# Patient Record
Sex: MALE | Race: WHITE | NOT HISPANIC OR LATINO
[De-identification: names, ages, dates, MRNs, and addresses within clinical notes are randomized per-mention and may not be internally consistent; named-entity substitution may affect disease eponyms.]

---

## 2017-03-01 ENCOUNTER — APPOINTMENT (OUTPATIENT)
Dept: ORTHOPEDIC SURGERY | Facility: CLINIC | Age: 67
End: 2017-03-01

## 2017-03-01 VITALS — WEIGHT: 210 LBS | BODY MASS INDEX: 32.96 KG/M2 | HEIGHT: 67 IN

## 2017-10-19 ENCOUNTER — EMERGENCY (EMERGENCY)
Facility: HOSPITAL | Age: 67
LOS: 1 days | Discharge: HOME | End: 2017-10-19
Attending: EMERGENCY MEDICINE | Admitting: EMERGENCY MEDICINE
Payer: MEDICARE

## 2017-10-19 VITALS
RESPIRATION RATE: 18 BRPM | OXYGEN SATURATION: 98 % | DIASTOLIC BLOOD PRESSURE: 106 MMHG | TEMPERATURE: 98 F | HEART RATE: 84 BPM | SYSTOLIC BLOOD PRESSURE: 190 MMHG

## 2017-10-19 VITALS
OXYGEN SATURATION: 100 % | HEART RATE: 86 BPM | SYSTOLIC BLOOD PRESSURE: 167 MMHG | DIASTOLIC BLOOD PRESSURE: 89 MMHG | RESPIRATION RATE: 18 BRPM

## 2017-10-19 LAB
ALBUMIN SERPL ELPH-MCNC: 4 G/DL — SIGNIFICANT CHANGE UP (ref 3.3–5)
ALP SERPL-CCNC: 50 U/L — SIGNIFICANT CHANGE UP (ref 40–120)
ALT FLD-CCNC: 15 U/L — SIGNIFICANT CHANGE UP (ref 4–41)
APTT BLD: 33 SEC — SIGNIFICANT CHANGE UP (ref 27.5–37.4)
AST SERPL-CCNC: 20 U/L — SIGNIFICANT CHANGE UP (ref 4–40)
BILIRUB SERPL-MCNC: 0.4 MG/DL — SIGNIFICANT CHANGE UP (ref 0.2–1.2)
BUN SERPL-MCNC: 16 MG/DL — SIGNIFICANT CHANGE UP (ref 7–23)
CALCIUM SERPL-MCNC: 8.9 MG/DL — SIGNIFICANT CHANGE UP (ref 8.4–10.5)
CHLORIDE SERPL-SCNC: 100 MMOL/L — SIGNIFICANT CHANGE UP (ref 98–107)
CO2 SERPL-SCNC: 29 MMOL/L — SIGNIFICANT CHANGE UP (ref 22–31)
CREAT SERPL-MCNC: 0.77 MG/DL — SIGNIFICANT CHANGE UP (ref 0.5–1.3)
GLUCOSE SERPL-MCNC: 69 MG/DL — LOW (ref 70–99)
HCT VFR BLD CALC: 35.5 % — LOW (ref 39–50)
HGB BLD-MCNC: 10.5 G/DL — LOW (ref 13–17)
INR BLD: 1.16 — SIGNIFICANT CHANGE UP (ref 0.88–1.17)
MCHC RBC-ENTMCNC: 18.2 PG — LOW (ref 27–34)
MCHC RBC-ENTMCNC: 29.6 % — LOW (ref 32–36)
MCV RBC AUTO: 61.5 FL — LOW (ref 80–100)
NRBC # FLD: 0 — SIGNIFICANT CHANGE UP
PLATELET # BLD AUTO: 265 K/UL — SIGNIFICANT CHANGE UP (ref 150–400)
PMV BLD: SIGNIFICANT CHANGE UP FL (ref 7–13)
POTASSIUM SERPL-MCNC: 3.9 MMOL/L — SIGNIFICANT CHANGE UP (ref 3.5–5.3)
POTASSIUM SERPL-SCNC: 3.9 MMOL/L — SIGNIFICANT CHANGE UP (ref 3.5–5.3)
PROT SERPL-MCNC: 7 G/DL — SIGNIFICANT CHANGE UP (ref 6–8.3)
PROTHROM AB SERPL-ACNC: 13 SEC — SIGNIFICANT CHANGE UP (ref 9.8–13.1)
RBC # BLD: 5.77 M/UL — SIGNIFICANT CHANGE UP (ref 4.2–5.8)
RBC # FLD: 16.5 % — HIGH (ref 10.3–14.5)
SODIUM SERPL-SCNC: 142 MMOL/L — SIGNIFICANT CHANGE UP (ref 135–145)
WBC # BLD: 8.85 K/UL — SIGNIFICANT CHANGE UP (ref 3.8–10.5)
WBC # FLD AUTO: 8.85 K/UL — SIGNIFICANT CHANGE UP (ref 3.8–10.5)

## 2017-10-19 PROCEDURE — 99284 EMERGENCY DEPT VISIT MOD MDM: CPT | Mod: GC

## 2017-10-19 RX ORDER — AMOXICILLIN 250 MG/5ML
500 SUSPENSION, RECONSTITUTED, ORAL (ML) ORAL ONCE
Qty: 0 | Refills: 0 | Status: COMPLETED | OUTPATIENT
Start: 2017-10-19 | End: 2017-10-19

## 2017-10-19 RX ORDER — AMOXICILLIN 250 MG/5ML
1 SUSPENSION, RECONSTITUTED, ORAL (ML) ORAL
Qty: 20 | Refills: 0 | OUTPATIENT
Start: 2017-10-19 | End: 2017-10-29

## 2017-10-19 RX ADMIN — Medication 500 MILLIGRAM(S): at 23:02

## 2017-10-19 NOTE — ED PROVIDER NOTE - PMH
Anxiety    Brain lesion    DM (diabetes mellitus), type 2    Heart murmur    HTN (hypertension)    Hyperlipidemia    Peripheral neuropathy    Spinal stenosis

## 2017-10-19 NOTE — ED PROVIDER NOTE - PLAN OF CARE
- Follow up with your primary care doctor in 1-2 days.  - Follow up with ENT, see attached list.     - Bring results with you to the appointment.   - Take tylenol 650mg for pain or fever.   - Return to the ED for new or worsening symptoms.

## 2017-10-19 NOTE — ED PROVIDER NOTE - OBJECTIVE STATEMENT
66M h/o HTN, HLD, DM, MVR on ASA/Plavix p/w L-sided epistaxis since 1730. Some small clots. No sneezing/blowing nose. Has had epistaxis in past but first time on blood thinners. Denies SOB, CP, weakness, falls, injuries.

## 2017-10-19 NOTE — ED PROCEDURE NOTE - ATTENDING CONTRIBUTION TO CARE
Loccro[place]   present during procedure    nares suctioned to remove  blood  rhinorocket inserted by resident with resolution of bleed (small residual bleed at first which resolved with repositioning of device)

## 2017-10-19 NOTE — ED ADULT NURSE NOTE - OBJECTIVE STATEMENT
Pt s/p mitral valve repair 6/2017 and started on aspirin and plavix. Pt w hx of nosebleeds in past. Today pt turned heat on in apartment, forgot to take saline nasal spray and nose bleed started. Unable to stop bleeding with pressure. Left nostril noted to be continuously bleeding. pressure with clean gauzes applied. Afrin soaked gauze applied inside nose with no relief of bleeding. Rhino Rocket to be inserted by MD. PIV placed, labs drawn and sent as ordered.

## 2017-10-19 NOTE — ED PROVIDER NOTE - PROGRESS NOTE DETAILS
Chalo PGY-3: rhino rocket placed 2 hours ago, no bleeding since. oropharynx is clear. will f/u with outpatient ENT. pt observed  no further bleeding  anemic  hgb near baseline

## 2017-10-19 NOTE — ED ADULT TRIAGE NOTE - CHIEF COMPLAINT QUOTE
sudden onset nosebleed (active at this time) on plavix d/t valve. 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

## 2017-10-19 NOTE — ED PROVIDER NOTE - CARE PLAN
Principal Discharge DX:	Epistaxis Principal Discharge DX:	Epistaxis  Instructions for follow-up, activity and diet:	- Follow up with your primary care doctor in 1-2 days.  - Follow up with ENT, see attached list.     - Bring results with you to the appointment.   - Take tylenol 650mg for pain or fever.   - Return to the ED for new or worsening symptoms.

## 2017-10-19 NOTE — ED PROVIDER NOTE - ATTENDING CONTRIBUTION TO CARE
Locurto Pt with left sided epistaxis on  plavix  no dizziness  nio URI sx no SOB  baseline anemia  no control with pressure   bleeding appears distal bu exact source no seen  rhinorocket [passed  initially  bleed seen left nostril  some blood in pharynx    after pack  bleed controlled  pharynx cleaer no longer coughing [place] Pt with left sided epistaxis on  plavix  no dizziness  nio URI sx no SOB  baseline anemia  no control with pressure   bleeding appears distal bu exact source no seen  rhinorocket [passed  initially  bleed seen left nostril  some blood in pharynx    after pack  bleed controlled  pharynx clear no longer coughing

## 2017-10-28 ENCOUNTER — EMERGENCY (EMERGENCY)
Facility: HOSPITAL | Age: 67
LOS: 1 days | Discharge: HOME | End: 2017-10-28
Attending: EMERGENCY MEDICINE | Admitting: EMERGENCY MEDICINE
Payer: MEDICARE

## 2017-10-28 VITALS
HEART RATE: 73 BPM | RESPIRATION RATE: 20 BRPM | SYSTOLIC BLOOD PRESSURE: 167 MMHG | DIASTOLIC BLOOD PRESSURE: 89 MMHG | TEMPERATURE: 98 F | OXYGEN SATURATION: 100 %

## 2017-10-28 LAB
ALBUMIN SERPL ELPH-MCNC: 3.9 G/DL — SIGNIFICANT CHANGE UP (ref 3.3–5)
ALP SERPL-CCNC: 53 U/L — SIGNIFICANT CHANGE UP (ref 40–120)
ALT FLD-CCNC: 13 U/L — SIGNIFICANT CHANGE UP (ref 4–41)
ANISOCYTOSIS BLD QL: SLIGHT — SIGNIFICANT CHANGE UP
APTT BLD: 36.8 SEC — SIGNIFICANT CHANGE UP (ref 27.5–37.4)
AST SERPL-CCNC: 21 U/L — SIGNIFICANT CHANGE UP (ref 4–40)
BASOPHILS # BLD AUTO: 0.05 K/UL — SIGNIFICANT CHANGE UP (ref 0–0.2)
BASOPHILS NFR BLD AUTO: 0.6 % — SIGNIFICANT CHANGE UP (ref 0–2)
BASOPHILS NFR SPEC: 0.9 % — SIGNIFICANT CHANGE UP (ref 0–2)
BILIRUB SERPL-MCNC: 0.5 MG/DL — SIGNIFICANT CHANGE UP (ref 0.2–1.2)
BLASTS # FLD: 0 % — SIGNIFICANT CHANGE UP (ref 0–0)
BUN SERPL-MCNC: 10 MG/DL — SIGNIFICANT CHANGE UP (ref 7–23)
CALCIUM SERPL-MCNC: 9.1 MG/DL — SIGNIFICANT CHANGE UP (ref 8.4–10.5)
CHLORIDE SERPL-SCNC: 101 MMOL/L — SIGNIFICANT CHANGE UP (ref 98–107)
CO2 SERPL-SCNC: 31 MMOL/L — SIGNIFICANT CHANGE UP (ref 22–31)
CREAT SERPL-MCNC: 0.59 MG/DL — SIGNIFICANT CHANGE UP (ref 0.5–1.3)
DACRYOCYTES BLD QL SMEAR: SLIGHT — SIGNIFICANT CHANGE UP
ELLIPTOCYTES BLD QL SMEAR: SLIGHT — SIGNIFICANT CHANGE UP
EOSINOPHIL # BLD AUTO: 0.11 K/UL — SIGNIFICANT CHANGE UP (ref 0–0.5)
EOSINOPHIL NFR BLD AUTO: 1.3 % — SIGNIFICANT CHANGE UP (ref 0–6)
EOSINOPHIL NFR FLD: 2.6 % — SIGNIFICANT CHANGE UP (ref 0–6)
GIANT PLATELETS BLD QL SMEAR: PRESENT — SIGNIFICANT CHANGE UP
GLUCOSE SERPL-MCNC: 82 MG/DL — SIGNIFICANT CHANGE UP (ref 70–99)
HCT VFR BLD CALC: 34.2 % — LOW (ref 39–50)
HGB BLD-MCNC: 10.2 G/DL — LOW (ref 13–17)
HYPOCHROMIA BLD QL: SIGNIFICANT CHANGE UP
IMM GRANULOCYTES # BLD AUTO: 0.02 # — SIGNIFICANT CHANGE UP
IMM GRANULOCYTES NFR BLD AUTO: 0.2 % — SIGNIFICANT CHANGE UP (ref 0–1.5)
INR BLD: 1.14 — SIGNIFICANT CHANGE UP (ref 0.88–1.17)
LYMPHOCYTES # BLD AUTO: 1.39 K/UL — SIGNIFICANT CHANGE UP (ref 1–3.3)
LYMPHOCYTES # BLD AUTO: 16.5 % — SIGNIFICANT CHANGE UP (ref 13–44)
LYMPHOCYTES NFR SPEC AUTO: 9.7 % — LOW (ref 13–44)
MACROCYTES BLD QL: SLIGHT — SIGNIFICANT CHANGE UP
MCHC RBC-ENTMCNC: 18.3 PG — LOW (ref 27–34)
MCHC RBC-ENTMCNC: 29.8 % — LOW (ref 32–36)
MCV RBC AUTO: 61.4 FL — LOW (ref 80–100)
METAMYELOCYTES # FLD: 0 % — SIGNIFICANT CHANGE UP (ref 0–1)
MICROCYTES BLD QL: SIGNIFICANT CHANGE UP
MONOCYTES # BLD AUTO: 0.62 K/UL — SIGNIFICANT CHANGE UP (ref 0–0.9)
MONOCYTES NFR BLD AUTO: 7.4 % — SIGNIFICANT CHANGE UP (ref 2–14)
MONOCYTES NFR BLD: 2.7 % — SIGNIFICANT CHANGE UP (ref 2–9)
MYELOCYTES NFR BLD: 0 % — SIGNIFICANT CHANGE UP (ref 0–0)
NEUTROPHIL AB SER-ACNC: 78.8 % — HIGH (ref 43–77)
NEUTROPHILS # BLD AUTO: 6.22 K/UL — SIGNIFICANT CHANGE UP (ref 1.8–7.4)
NEUTROPHILS NFR BLD AUTO: 74 % — SIGNIFICANT CHANGE UP (ref 43–77)
NEUTS BAND # BLD: 0 % — SIGNIFICANT CHANGE UP (ref 0–6)
NRBC # FLD: 0 — SIGNIFICANT CHANGE UP
OTHER - HEMATOLOGY %: 0 — SIGNIFICANT CHANGE UP
OVALOCYTES BLD QL SMEAR: SLIGHT — SIGNIFICANT CHANGE UP
PLATELET # BLD AUTO: 251 K/UL — SIGNIFICANT CHANGE UP (ref 150–400)
PLATELET COUNT - ESTIMATE: NORMAL — SIGNIFICANT CHANGE UP
PMV BLD: SIGNIFICANT CHANGE UP FL (ref 7–13)
POIKILOCYTOSIS BLD QL AUTO: SLIGHT — SIGNIFICANT CHANGE UP
POTASSIUM SERPL-MCNC: 3.8 MMOL/L — SIGNIFICANT CHANGE UP (ref 3.5–5.3)
POTASSIUM SERPL-SCNC: 3.8 MMOL/L — SIGNIFICANT CHANGE UP (ref 3.5–5.3)
PROMYELOCYTES # FLD: 0 % — SIGNIFICANT CHANGE UP (ref 0–0)
PROT SERPL-MCNC: 7 G/DL — SIGNIFICANT CHANGE UP (ref 6–8.3)
PROTHROM AB SERPL-ACNC: 12.8 SEC — SIGNIFICANT CHANGE UP (ref 9.8–13.1)
RBC # BLD: 5.57 M/UL — SIGNIFICANT CHANGE UP (ref 4.2–5.8)
RBC # FLD: 16.3 % — HIGH (ref 10.3–14.5)
SCHISTOCYTES BLD QL AUTO: SLIGHT — SIGNIFICANT CHANGE UP
SODIUM SERPL-SCNC: 144 MMOL/L — SIGNIFICANT CHANGE UP (ref 135–145)
TARGETS BLD QL SMEAR: SLIGHT — SIGNIFICANT CHANGE UP
VARIANT LYMPHS # BLD: 5.3 % — SIGNIFICANT CHANGE UP
WBC # BLD: 8.41 K/UL — SIGNIFICANT CHANGE UP (ref 3.8–10.5)
WBC # FLD AUTO: 8.41 K/UL — SIGNIFICANT CHANGE UP (ref 3.8–10.5)

## 2017-10-28 PROCEDURE — 99284 EMERGENCY DEPT VISIT MOD MDM: CPT | Mod: 25,GC

## 2017-10-28 PROCEDURE — 30903 CONTROL OF NOSEBLEED: CPT | Mod: LT

## 2017-10-28 NOTE — ED ADULT NURSE NOTE - OBJECTIVE STATEMENT
Patient presented to ED A&O x4, with c/o right nostril epistaxis x 2 hours.  Denies any injury. Patient is on Plavix and ASA daily. Blood work drawn and sent to lab. ER physician evaluated patient.  Will continue to monitor patient closely. Evelin CUEVAS

## 2017-10-28 NOTE — ED PROVIDER NOTE - PROGRESS NOTE DETAILS
pt still with mild bleeding after 1-2 hours compression, left rhino rocket placed, will observe and ent follow up bleeding improved with rhinorocket, pt to follow with ent this week, will return for worsening symptoms

## 2017-10-28 NOTE — ED PROVIDER NOTE - ATTENDING CONTRIBUTION TO CARE
65 yo M on asa and plavix with epistaxis, improving. Pt given afrin and apply pressure, reassess.   Will get h/h, plts and coags and reassess.   -Pt required rhinorocket as rebleeding occurred (L nare). Discussed with ENT and with pt's pmd and will follow up with them tomorrow.  I performed a history and physical exam of the patient and discussed their management with the resident. I reviewed the resident's note and agree with the documented findings and plan of care. My medical decision making and observations are found above.

## 2017-10-28 NOTE — ED PROVIDER NOTE - MUSCULOSKELETAL, MLM
Spine appears normal, range of motion is not limited, no muscle or joint tenderness + woundvac right upper back

## 2017-10-28 NOTE — ED PROVIDER NOTE - MEDICAL DECISION MAKING DETAILS
67yo male on asa and plavix with epistaxis, improving. Pt given afrin and compression. Will get basic labs and observe. if bleeding not completely stopped will try cautery vs rhinorocket

## 2017-10-28 NOTE — ED ADULT TRIAGE NOTE - CHIEF COMPLAINT QUOTE
Pt who is on plavix and 81mg of asa presents with c/o epistaxis. Bleeding controlled by EMS prior to ED arrival.

## 2017-10-28 NOTE — ED PROVIDER NOTE - OBJECTIVE STATEMENT
66M h/o HTN, HLD, DM, MVR on ASA/Plavix p/w left sided epistaxis x 2 hours now improving. Pt had similar episode last week had rhino rocket placed in ED and nasal cautery by outpt ENT Dr Pierre. EMS applied gauze and compression and bleeding stopped. NO lightheadedness, chest pain sob.

## 2017-11-02 ENCOUNTER — INPATIENT (INPATIENT)
Facility: HOSPITAL | Age: 67
LOS: 5 days | Discharge: NURSING, REHAB OR LONG-TERM CARE | End: 2017-11-08
Attending: INTERNAL MEDICINE | Admitting: INTERNAL MEDICINE
Payer: MEDICARE

## 2017-11-02 VITALS
HEART RATE: 57 BPM | SYSTOLIC BLOOD PRESSURE: 123 MMHG | TEMPERATURE: 98 F | RESPIRATION RATE: 15 BRPM | DIASTOLIC BLOOD PRESSURE: 73 MMHG | OXYGEN SATURATION: 100 %

## 2017-11-02 DIAGNOSIS — Z98.890 OTHER SPECIFIED POSTPROCEDURAL STATES: Chronic | ICD-10-CM

## 2017-11-02 DIAGNOSIS — I10 ESSENTIAL (PRIMARY) HYPERTENSION: ICD-10-CM

## 2017-11-02 DIAGNOSIS — D50.0 IRON DEFICIENCY ANEMIA SECONDARY TO BLOOD LOSS (CHRONIC): ICD-10-CM

## 2017-11-02 DIAGNOSIS — R53.1 WEAKNESS: ICD-10-CM

## 2017-11-02 DIAGNOSIS — Z29.9 ENCOUNTER FOR PROPHYLACTIC MEASURES, UNSPECIFIED: ICD-10-CM

## 2017-11-02 DIAGNOSIS — Z98.1 ARTHRODESIS STATUS: Chronic | ICD-10-CM

## 2017-11-02 DIAGNOSIS — M48.00 SPINAL STENOSIS, SITE UNSPECIFIED: ICD-10-CM

## 2017-11-02 DIAGNOSIS — R04.0 EPISTAXIS: ICD-10-CM

## 2017-11-02 DIAGNOSIS — E11.42 TYPE 2 DIABETES MELLITUS WITH DIABETIC POLYNEUROPATHY: ICD-10-CM

## 2017-11-02 DIAGNOSIS — T81.89XA OTHER COMPLICATIONS OF PROCEDURES, NOT ELSEWHERE CLASSIFIED, INITIAL ENCOUNTER: ICD-10-CM

## 2017-11-02 DIAGNOSIS — E03.9 HYPOTHYROIDISM, UNSPECIFIED: ICD-10-CM

## 2017-11-02 DIAGNOSIS — I63.9 CEREBRAL INFARCTION, UNSPECIFIED: ICD-10-CM

## 2017-11-02 DIAGNOSIS — I25.10 ATHEROSCLEROTIC HEART DISEASE OF NATIVE CORONARY ARTERY WITHOUT ANGINA PECTORIS: ICD-10-CM

## 2017-11-02 LAB
ALBUMIN SERPL ELPH-MCNC: 3.9 G/DL — SIGNIFICANT CHANGE UP (ref 3.3–5)
ALP SERPL-CCNC: 49 U/L — SIGNIFICANT CHANGE UP (ref 40–120)
ALT FLD-CCNC: 23 U/L — SIGNIFICANT CHANGE UP (ref 4–41)
APPEARANCE UR: CLEAR — SIGNIFICANT CHANGE UP
APTT BLD: 31.2 SEC — SIGNIFICANT CHANGE UP (ref 27.5–37.4)
AST SERPL-CCNC: 40 U/L — SIGNIFICANT CHANGE UP (ref 4–40)
BASE EXCESS BLDV CALC-SCNC: 7.9 MMOL/L — SIGNIFICANT CHANGE UP
BASOPHILS # BLD AUTO: 0.02 K/UL — SIGNIFICANT CHANGE UP (ref 0–0.2)
BASOPHILS NFR BLD AUTO: 0.2 % — SIGNIFICANT CHANGE UP (ref 0–2)
BILIRUB SERPL-MCNC: 0.4 MG/DL — SIGNIFICANT CHANGE UP (ref 0.2–1.2)
BILIRUB UR-MCNC: NEGATIVE — SIGNIFICANT CHANGE UP
BLD GP AB SCN SERPL QL: NEGATIVE — SIGNIFICANT CHANGE UP
BLOOD GAS VENOUS - CREATININE: 0.57 MG/DL — SIGNIFICANT CHANGE UP (ref 0.5–1.3)
BLOOD UR QL VISUAL: NEGATIVE — SIGNIFICANT CHANGE UP
BUN SERPL-MCNC: 16 MG/DL — SIGNIFICANT CHANGE UP (ref 7–23)
CALCIUM SERPL-MCNC: 8.8 MG/DL — SIGNIFICANT CHANGE UP (ref 8.4–10.5)
CHLORIDE BLDV-SCNC: 101 MMOL/L — SIGNIFICANT CHANGE UP (ref 96–108)
CHLORIDE SERPL-SCNC: 102 MMOL/L — SIGNIFICANT CHANGE UP (ref 98–107)
CK MB BLD-MCNC: 2.6 — HIGH (ref 0–2.5)
CK MB BLD-MCNC: 4.53 NG/ML — SIGNIFICANT CHANGE UP (ref 1–6.6)
CK SERPL-CCNC: 173 U/L — SIGNIFICANT CHANGE UP (ref 30–200)
CO2 SERPL-SCNC: 31 MMOL/L — SIGNIFICANT CHANGE UP (ref 22–31)
COLOR SPEC: YELLOW — SIGNIFICANT CHANGE UP
CREAT SERPL-MCNC: 0.63 MG/DL — SIGNIFICANT CHANGE UP (ref 0.5–1.3)
EOSINOPHIL # BLD AUTO: 0.07 K/UL — SIGNIFICANT CHANGE UP (ref 0–0.5)
EOSINOPHIL NFR BLD AUTO: 0.6 % — SIGNIFICANT CHANGE UP (ref 0–6)
FERRITIN SERPL-MCNC: 36.14 NG/ML — SIGNIFICANT CHANGE UP (ref 30–400)
GAS PNL BLDV: 140 MMOL/L — SIGNIFICANT CHANGE UP (ref 136–146)
GLUCOSE BLDC GLUCOMTR-MCNC: 103 MG/DL — HIGH (ref 70–99)
GLUCOSE BLDC GLUCOMTR-MCNC: 89 MG/DL — SIGNIFICANT CHANGE UP (ref 70–99)
GLUCOSE BLDC GLUCOMTR-MCNC: 99 MG/DL — SIGNIFICANT CHANGE UP (ref 70–99)
GLUCOSE BLDV-MCNC: 96 — SIGNIFICANT CHANGE UP (ref 70–99)
GLUCOSE SERPL-MCNC: 102 MG/DL — HIGH (ref 70–99)
GLUCOSE UR-MCNC: NEGATIVE — SIGNIFICANT CHANGE UP
HCO3 BLDV-SCNC: 31 MMOL/L — HIGH (ref 20–27)
HCT VFR BLD CALC: 26.2 % — LOW (ref 39–50)
HCT VFR BLD CALC: 27.1 % — LOW (ref 39–50)
HCT VFR BLDV CALC: 26 % — LOW (ref 39–51)
HGB BLD-MCNC: 8.2 G/DL — LOW (ref 13–17)
HGB BLD-MCNC: 8.3 G/DL — LOW (ref 13–17)
HGB BLDV-MCNC: 8.4 G/DL — LOW (ref 13–17)
IMM GRANULOCYTES # BLD AUTO: 0.03 # — SIGNIFICANT CHANGE UP
IMM GRANULOCYTES NFR BLD AUTO: 0.3 % — SIGNIFICANT CHANGE UP (ref 0–1.5)
INR BLD: 1.17 — SIGNIFICANT CHANGE UP (ref 0.88–1.17)
IRON SATN MFR SERPL: 19 UG/DL — LOW (ref 45–165)
IRON SATN MFR SERPL: 289 UG/DL — SIGNIFICANT CHANGE UP (ref 155–535)
KETONES UR-MCNC: NEGATIVE — SIGNIFICANT CHANGE UP
LACTATE BLDV-MCNC: 1 MMOL/L — SIGNIFICANT CHANGE UP (ref 0.5–2)
LEUKOCYTE ESTERASE UR-ACNC: NEGATIVE — SIGNIFICANT CHANGE UP
LYMPHOCYTES # BLD AUTO: 1.59 K/UL — SIGNIFICANT CHANGE UP (ref 1–3.3)
LYMPHOCYTES # BLD AUTO: 14.3 % — SIGNIFICANT CHANGE UP (ref 13–44)
MCHC RBC-ENTMCNC: 18.4 PG — LOW (ref 27–34)
MCHC RBC-ENTMCNC: 19 PG — LOW (ref 27–34)
MCHC RBC-ENTMCNC: 30.6 % — LOW (ref 32–36)
MCHC RBC-ENTMCNC: 31.3 % — LOW (ref 32–36)
MCV RBC AUTO: 60.1 FL — LOW (ref 80–100)
MCV RBC AUTO: 60.8 FL — LOW (ref 80–100)
MONOCYTES # BLD AUTO: 1.04 K/UL — HIGH (ref 0–0.9)
MONOCYTES NFR BLD AUTO: 9.4 % — SIGNIFICANT CHANGE UP (ref 2–14)
MUCOUS THREADS # UR AUTO: SIGNIFICANT CHANGE UP
NEUTROPHILS # BLD AUTO: 8.35 K/UL — HIGH (ref 1.8–7.4)
NEUTROPHILS NFR BLD AUTO: 75.2 % — SIGNIFICANT CHANGE UP (ref 43–77)
NITRITE UR-MCNC: NEGATIVE — SIGNIFICANT CHANGE UP
NON-SQ EPI CELLS # UR AUTO: <1 — SIGNIFICANT CHANGE UP
NRBC # FLD: 0 — SIGNIFICANT CHANGE UP
NRBC # FLD: 0.02 — SIGNIFICANT CHANGE UP
PCO2 BLDV: 55 MMHG — HIGH (ref 41–51)
PH BLDV: 7.39 PH — SIGNIFICANT CHANGE UP (ref 7.32–7.43)
PH UR: 6 — SIGNIFICANT CHANGE UP (ref 4.6–8)
PLATELET # BLD AUTO: 229 K/UL — SIGNIFICANT CHANGE UP (ref 150–400)
PLATELET # BLD AUTO: 249 K/UL — SIGNIFICANT CHANGE UP (ref 150–400)
PMV BLD: 11 FL — SIGNIFICANT CHANGE UP (ref 7–13)
PMV BLD: SIGNIFICANT CHANGE UP FL (ref 7–13)
PO2 BLDV: 50 MMHG — HIGH (ref 35–40)
POTASSIUM BLDV-SCNC: 3.4 MMOL/L — SIGNIFICANT CHANGE UP (ref 3.4–4.5)
POTASSIUM SERPL-MCNC: 4.4 MMOL/L — SIGNIFICANT CHANGE UP (ref 3.5–5.3)
POTASSIUM SERPL-SCNC: 4.4 MMOL/L — SIGNIFICANT CHANGE UP (ref 3.5–5.3)
PROT SERPL-MCNC: 6.3 G/DL — SIGNIFICANT CHANGE UP (ref 6–8.3)
PROT UR-MCNC: 20 — SIGNIFICANT CHANGE UP
PROTHROM AB SERPL-ACNC: 13.1 SEC — SIGNIFICANT CHANGE UP (ref 9.8–13.1)
RBC # BLD: 4.31 M/UL — SIGNIFICANT CHANGE UP (ref 4.2–5.8)
RBC # BLD: 4.51 M/UL — SIGNIFICANT CHANGE UP (ref 4.2–5.8)
RBC # FLD: 16.7 % — HIGH (ref 10.3–14.5)
RBC # FLD: 16.7 % — HIGH (ref 10.3–14.5)
RBC CASTS # UR COMP ASSIST: HIGH (ref 0–?)
RETICS #: 0.1 10X6/UL — HIGH (ref 0.02–0.07)
RETICS/RBC NFR: 1.7 % — SIGNIFICANT CHANGE UP (ref 0.5–2.5)
RH IG SCN BLD-IMP: POSITIVE — SIGNIFICANT CHANGE UP
SAO2 % BLDV: 81.7 % — SIGNIFICANT CHANGE UP (ref 60–85)
SODIUM SERPL-SCNC: 143 MMOL/L — SIGNIFICANT CHANGE UP (ref 135–145)
SP GR SPEC: 1.02 — SIGNIFICANT CHANGE UP (ref 1–1.03)
TROPONIN T SERPL-MCNC: < 0.06 NG/ML — SIGNIFICANT CHANGE UP (ref 0–0.06)
TSH SERPL-MCNC: 3.27 UIU/ML — SIGNIFICANT CHANGE UP (ref 0.27–4.2)
UIBC SERPL-MCNC: 270 UG/DL — SIGNIFICANT CHANGE UP (ref 110–370)
UROBILINOGEN FLD QL: 1 E.U. — SIGNIFICANT CHANGE UP (ref 0.1–0.2)
WBC # BLD: 11.1 K/UL — HIGH (ref 3.8–10.5)
WBC # BLD: 9.75 K/UL — SIGNIFICANT CHANGE UP (ref 3.8–10.5)
WBC # FLD AUTO: 11.1 K/UL — HIGH (ref 3.8–10.5)
WBC # FLD AUTO: 9.75 K/UL — SIGNIFICANT CHANGE UP (ref 3.8–10.5)
WBC UR QL: SIGNIFICANT CHANGE UP (ref 0–?)

## 2017-11-02 PROCEDURE — 70450 CT HEAD/BRAIN W/O DYE: CPT | Mod: 26

## 2017-11-02 PROCEDURE — 71010: CPT | Mod: 26

## 2017-11-02 PROCEDURE — 99223 1ST HOSP IP/OBS HIGH 75: CPT

## 2017-11-02 RX ORDER — GABAPENTIN 400 MG/1
200 CAPSULE ORAL EVERY 8 HOURS
Qty: 0 | Refills: 0 | Status: DISCONTINUED | OUTPATIENT
Start: 2017-11-02 | End: 2017-11-08

## 2017-11-02 RX ORDER — METOPROLOL TARTRATE 50 MG
50 TABLET ORAL DAILY
Qty: 0 | Refills: 0 | Status: DISCONTINUED | OUTPATIENT
Start: 2017-11-02 | End: 2017-11-08

## 2017-11-02 RX ORDER — ASPIRIN/CALCIUM CARB/MAGNESIUM 324 MG
81 TABLET ORAL DAILY
Qty: 0 | Refills: 0 | Status: DISCONTINUED | OUTPATIENT
Start: 2017-11-02 | End: 2017-11-08

## 2017-11-02 RX ORDER — INSULIN LISPRO 100/ML
VIAL (ML) SUBCUTANEOUS AT BEDTIME
Qty: 0 | Refills: 0 | Status: DISCONTINUED | OUTPATIENT
Start: 2017-11-02 | End: 2017-11-08

## 2017-11-02 RX ORDER — SIMVASTATIN 20 MG/1
40 TABLET, FILM COATED ORAL AT BEDTIME
Qty: 0 | Refills: 0 | Status: DISCONTINUED | OUTPATIENT
Start: 2017-11-02 | End: 2017-11-08

## 2017-11-02 RX ORDER — LACTOBACILLUS ACIDOPHILUS 100MM CELL
1 CAPSULE ORAL
Qty: 0 | Refills: 0 | Status: DISCONTINUED | OUTPATIENT
Start: 2017-11-02 | End: 2017-11-08

## 2017-11-02 RX ORDER — GLUCAGON INJECTION, SOLUTION 0.5 MG/.1ML
1 INJECTION, SOLUTION SUBCUTANEOUS ONCE
Qty: 0 | Refills: 0 | Status: DISCONTINUED | OUTPATIENT
Start: 2017-11-02 | End: 2017-11-08

## 2017-11-02 RX ORDER — TAMSULOSIN HYDROCHLORIDE 0.4 MG/1
0.4 CAPSULE ORAL AT BEDTIME
Qty: 0 | Refills: 0 | Status: DISCONTINUED | OUTPATIENT
Start: 2017-11-02 | End: 2017-11-08

## 2017-11-02 RX ORDER — INSULIN LISPRO 100/ML
VIAL (ML) SUBCUTANEOUS
Qty: 0 | Refills: 0 | Status: DISCONTINUED | OUTPATIENT
Start: 2017-11-02 | End: 2017-11-08

## 2017-11-02 RX ORDER — HYDROCHLOROTHIAZIDE 25 MG
12.5 TABLET ORAL DAILY
Qty: 0 | Refills: 0 | Status: DISCONTINUED | OUTPATIENT
Start: 2017-11-02 | End: 2017-11-08

## 2017-11-02 RX ORDER — ASCORBIC ACID 60 MG
500 TABLET,CHEWABLE ORAL DAILY
Qty: 0 | Refills: 0 | Status: DISCONTINUED | OUTPATIENT
Start: 2017-11-02 | End: 2017-11-08

## 2017-11-02 RX ORDER — DEXTROSE 50 % IN WATER 50 %
25 SYRINGE (ML) INTRAVENOUS ONCE
Qty: 0 | Refills: 0 | Status: DISCONTINUED | OUTPATIENT
Start: 2017-11-02 | End: 2017-11-08

## 2017-11-02 RX ORDER — LEVOTHYROXINE SODIUM 125 MCG
112 TABLET ORAL DAILY
Qty: 0 | Refills: 0 | Status: DISCONTINUED | OUTPATIENT
Start: 2017-11-02 | End: 2017-11-08

## 2017-11-02 RX ORDER — CEPHALEXIN 500 MG
500 CAPSULE ORAL EVERY 8 HOURS
Qty: 0 | Refills: 0 | Status: DISCONTINUED | OUTPATIENT
Start: 2017-11-02 | End: 2017-11-08

## 2017-11-02 RX ORDER — DEXTROSE 50 % IN WATER 50 %
1 SYRINGE (ML) INTRAVENOUS ONCE
Qty: 0 | Refills: 0 | Status: DISCONTINUED | OUTPATIENT
Start: 2017-11-02 | End: 2017-11-08

## 2017-11-02 RX ORDER — DEXTROSE 50 % IN WATER 50 %
12.5 SYRINGE (ML) INTRAVENOUS ONCE
Qty: 0 | Refills: 0 | Status: DISCONTINUED | OUTPATIENT
Start: 2017-11-02 | End: 2017-11-08

## 2017-11-02 RX ORDER — FERROUS SULFATE 325(65) MG
325 TABLET ORAL
Qty: 0 | Refills: 0 | Status: DISCONTINUED | OUTPATIENT
Start: 2017-11-02 | End: 2017-11-08

## 2017-11-02 RX ADMIN — Medication 81 MILLIGRAM(S): at 11:30

## 2017-11-02 RX ADMIN — GABAPENTIN 200 MILLIGRAM(S): 400 CAPSULE ORAL at 14:42

## 2017-11-02 RX ADMIN — TAMSULOSIN HYDROCHLORIDE 0.4 MILLIGRAM(S): 0.4 CAPSULE ORAL at 23:38

## 2017-11-02 RX ADMIN — Medication 112 MICROGRAM(S): at 16:01

## 2017-11-02 RX ADMIN — Medication 500 MILLIGRAM(S): at 14:42

## 2017-11-02 RX ADMIN — Medication 500 MILLIGRAM(S): at 23:38

## 2017-11-02 RX ADMIN — GABAPENTIN 200 MILLIGRAM(S): 400 CAPSULE ORAL at 23:38

## 2017-11-02 RX ADMIN — SIMVASTATIN 40 MILLIGRAM(S): 20 TABLET, FILM COATED ORAL at 23:38

## 2017-11-02 RX ADMIN — Medication 50 MILLIGRAM(S): at 16:01

## 2017-11-02 RX ADMIN — Medication 325 MILLIGRAM(S): at 14:42

## 2017-11-02 RX ADMIN — Medication 12.5 MILLIGRAM(S): at 16:01

## 2017-11-02 NOTE — H&P ADULT - FAMILY HISTORY
Father  Still living? Unknown  Family history of other heart disease, Age at diagnosis: Age Unknown     Mother  Still living? Unknown  Family history of other heart disease, Age at diagnosis: Age Unknown

## 2017-11-02 NOTE — H&P ADULT - PROBLEM SELECTOR PLAN 4
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

## 2017-11-02 NOTE — CONSULT NOTE ADULT - ATTENDING COMMENTS
I spoke to the patient and reviewed carboplatin, since there are no new neurological symptoms he can followup with his neurologist as outpatient.

## 2017-11-02 NOTE — ED PROVIDER NOTE - PROGRESS NOTE DETAILS
Guillermo: [facility] hospitalist. awaiting response. kunal faust: received sign out from dr. mak at 0700 am to follow upcall back from [facility] and admit[facility]- admit. Pt denies fevers, urinary incontinence, fecal incontinence, saddle anesthesia. kunal faust: received sign out from dr. mak at 0700 am to follow upcall back from [facility] hospitalist and admit. [facility] will obtain ct head- admit. Pt denies fevers, urinary incontinence, fecal incontinence, saddle anesthesia. Pt moving all extremities, a & O x 3 HEATHER Estrada: Received sign out from dr. Li at 0700 am to follow up call back from [facility] hospitalist ( was paged) and admit. Spoke to [facility] hospitalist dr. asencio - will obtain ct head- admit. Pt has hx of spinal stenosis with R leg weakness, L arm weakness at baseline has been in and out of rehabs- was supposed to have surgery on his back but was not cleared due to his MVR and had valve repair in june. Had lipoma removed in september currently has wound vac. Pt states has felt weakness and had difficulty getting up out of a chair last night- walks with a cane or walker at baseline. Pt presented in the past to the ED for similar symptoms/story. Pt denies fevers, urinary incontinence, fecal incontinence, saddle anesthesia. Pt moving all extremities with FROM and strength- , a & O x 3, sensation intact, reflexes intact.

## 2017-11-02 NOTE — ADVANCED PRACTICE NURSE CONSULT - REASON FOR CONSULT
Patient seen on skin care rounds after wound care referral received for assessment of skin impairment and recommendations of topical management. Chart reviewed: Serum albumin 3.9g/dL, patient interviewed: reports removal of lipoma and NPWT/VAC placement since September, patient is followed outpatient by MD, presents with home NPWT/VAC in place (reports that it is changed every MWF by [facility]). 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  As per H&P, Patient reports that yesterday when trying to get out of chair LE felt weak and he slipped down and fell. He reports since discharge from ED on 10/28/17 rehana mchugh had fallen out and he had recurrence of bleeding. He lives alone and on Tuesday was able to go to ENT f/u visit, he reports at that visit packing was reinserted and he was restarted on antibiotics with plan to follow-up on Friday. Admitted with weakness.

## 2017-11-02 NOTE — H&P ADULT - ASSESSMENT
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

## 2017-11-02 NOTE — ED PROVIDER NOTE - OBJECTIVE STATEMENT
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 Pt with recent recurrent epistaxis, has L nasal packing x 2 days. Pt fell yesterday, slid off chair due to weakness and couldn't get up. No dizziness/CP/SOB/palpitations prior to fall. No head trauma or LOC.  No fever/chills, N/V/D, black/bloody stool, rash, or dysuria. No focal weakness/numbness. 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 Pt with recent recurrent epistaxis, has L nasal packing x 2 days. Pt fell yesterday, slid off chair due to weakness and had difficulty getting up. No dizziness/CP/SOB/palpitations prior to fall. No head trauma or LOC.  No fever/chills, N/V/D, black/bloody stool, rash, or dysuria. No new weakness/numbness. Pt with baseline L arm and R leg weakness from spinal stenosis, unchanged.

## 2017-11-02 NOTE — ED ADULT TRIAGE NOTE - CHIEF COMPLAINT QUOTE
alert no acute distress  c/o increasing weakness  was sliding out of chair and couldn't get back in   unable to walk    has had epistaxsis x 2 weeks   left rhino rocket in place    s/p fall this AM didn't get evaluated  fell on back in bath room  didn't hurt himself   recent tumor removal on back  has wound vac   no c/o fevers

## 2017-11-02 NOTE — ED PROVIDER NOTE - NEUROLOGICAL, MLM
Alert and oriented. CN 2-12 intact. Subtle weakness of L arm and R leg (baseline), otherwise 5/5 motor strength b/l. Normal sensation. No pronator drift. Normal coordination.

## 2017-11-02 NOTE — H&P ADULT - PROBLEM SELECTOR PLAN 1
Patient is nonfocal (has deficits in LUE and RLE that are chronic), no CP, palpitations, LOC, weakness may be due to acute anemia however not meeting criteria for transfusion  -check orthostatics  -PT consult Patient is nonfocal (has deficits in LUE and RLE that are chronic)--CTH w/ old infarct.  Also no CP, palpitations, LOC--EKG w/o changes, trop negative.  CK wnl.    Denies infectious symptoms.   Weakness may be due to acute anemia however not meeting criteria for transfusion--no BUSBY, no CP, just generalized weakness  -check orthostatics  -PT consult

## 2017-11-02 NOTE — H&P ADULT - PROBLEM SELECTOR PLAN 3
saw ENT tuesday, due for f/u Friday, packing in place  -resume ppx abx  -unclear if [facility] comes to [facility], will need to f/u saw ENT Tuesday, due for f/u Friday, packing in place  -resume ppx abx  -unclear if [facility] comes to [facility], will need to f/u as due for f/u Friday

## 2017-11-02 NOTE — ED PROVIDER NOTE - MEDICAL DECISION MAKING DETAILS
66M with generalized weakness. No new focal deficit; no head trauma; pt does not require CT imaging. Ddx: infection, anemia, metabolic abnormality, ACS. Plan: ekg, cxr, labs, UA, admit.

## 2017-11-02 NOTE — H&P ADULT - NSHPPHYSICALEXAM_GEN_ALL_CORE
T(C): 36.8 (11-02-17 @ 08:41), Max: 37.1 (11-02-17 @ 06:49)  HR: 79 (11-02-17 @ 08:41) (57 - 86)  BP: 153/87 (11-02-17 @ 08:41) (123/73 - 158/70)  RR: 18 (11-02-17 @ 08:41) (15 - 18)  SpO2: 99% (11-02-17 @ 08:41) (99% - 100%)      CAPILLARY BLOOD GLUCOSE  124 (02 Nov 2017 00:37)    PHYSICAL EXAM:  GENERAL: NAD, well-developed  HEAD:  Atraumatic, Normocephalic  EYES: PERRL, conjunctiva and sclera clear  NOSE: L nasal packing in place, serous drainage from nares   NECK: Supple, no JVD  CHEST/LUNG: Clear to auscultation bilaterally; no wheeze  HEART: Regular rate and rhythm; no murmurs, rubs, or gallops  ABDOMEN: Soft, Nontender, Nondistended; Bowel sounds present  EXTREMITIES:  warm and well perfused, no clubbing, cyanosis, or edema  PSYCH: AAOx3  NEUROLOGY: L arm 4/5,  R leg  4/5 otherwise 5/5 (states chronic)   SKIN: R upper back wound with vac in place, nontender to palpation

## 2017-11-02 NOTE — H&P ADULT - HISTORY OF PRESENT ILLNESS
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  Patient reports that yesterday when trying to get out of chair LE felt weak and he slipped down and fell.  Denies any LOC, CP, SOB, palpitations, focal weakness just reports feeling weakness since all the bleeding.   He reports since discharge from ED on 10/28 rehana mchugh had fallen out and he had recurrence of bleeding.   He lives alone and on Tuesday was able to go to ENT f/u visit (given ride by brother), he reports at that visit packing was reinserted and he was restarted on abx with plan to follow-up on Friday.  He states given recurrence of bleeding he stopped his plavix.  He denies bleeding elsewhere, fevers or chill.  He reports chronic deficits in L arm and R leg since cervical spine laminectomy in 2013, denies any worsening in sx, denies bowel or bladder symptoms.

## 2017-11-02 NOTE — ADVANCED PRACTICE NURSE CONSULT - RECOMMEDATIONS
Recommend follow up care with primary surgeon who performed removal of lipoma.     Right upper back: Cleanse with NS, pat dry. Apply Liquid barrier film to periwound skin. Apply Aquacel to wound base, cover with foam with border. Change daily. (will contact primary team to discuss placement of inpatient NPWT/VAC and follow MWF schedule).    Left anterior lower leg: Apply Betadine daily, allow to dry.    Right anterior lower leg and B/L heels: Apply Liquid barrier film daily.  Initiate low air loss bed therapy, initiate heel elevation, continue to turn & reposition q2h, continue moisture management with barrier creams & single breathable pad, continue measures to decrease friction/shear/pressure.     Please call wound care service line is further assistance is needed ([phone]).

## 2017-11-02 NOTE — H&P ADULT - PROBLEM SELECTOR PLAN 7
c/w asa/statin  unclear if SONIA but should like be on DAPT c/w asa/statin  unclear if SONIA but should likely be on DAPT

## 2017-11-02 NOTE — H&P ADULT - PSH
H/O mitral valve repair    H/O skin graft    Hx of appendectomy    S/P laminectomy with spinal fusion    S/P tonsillectomy

## 2017-11-02 NOTE — CONSULT NOTE ADULT - SUBJECTIVE AND OBJECTIVE BOX
HPI:  67 yo M with h/o HTN, DM2 w/ neuropathy presents w/ weakness.  Patient reports that yesterday when trying to get out of chair and legs felt weak and he slipped down and fell.  Still feels weak all over. No visual issues. Claims to have had stroke in 1993 but never saw a doctor for it. Admitted for bleeding from wound. NIHSS 0 mrs 2        MEDICATIONS  (STANDING):  ascorbic acid 500 milliGRAM(s) Oral daily  aspirin enteric coated 81 milliGRAM(s) Oral daily  cephalexin 500 milliGRAM(s) Oral every 8 hours  dextrose 50% Injectable 12.5 Gram(s) IV Push once  dextrose 50% Injectable 25 Gram(s) IV Push once  dextrose 50% Injectable 25 Gram(s) IV Push once  ferrous    sulfate 325 milliGRAM(s) Oral with lunch  gabapentin 200 milliGRAM(s) Oral every 8 hours  hydrochlorothiazide 12.5 milliGRAM(s) Oral daily  insulin lispro (HumaLOG) corrective regimen sliding scale   SubCutaneous three times a day before meals  insulin lispro (HumaLOG) corrective regimen sliding scale   SubCutaneous at bedtime  lactobacillus acidophilus 1 Tablet(s) Oral two times a day with meals  levothyroxine 112 MICROGram(s) Oral daily  metoprolol succinate ER 50 milliGRAM(s) Oral daily  simvastatin 40 milliGRAM(s) Oral at bedtime  tamsulosin 0.4 milliGRAM(s) Oral at bedtime    MEDICATIONS  (PRN):  dextrose Gel 1 Dose(s) Oral once PRN Blood Glucose LESS THAN 70 milliGRAM(s)/deciliter  glucagon  Injectable 1 milliGRAM(s) IntraMuscular once PRN Glucose LESS THAN 70 milligrams/deciliter    PAST MEDICAL & SURGICAL HISTORY:  Coronary artery disease involving native coronary artery of native heart without angina pectoris  Acquired hypothyroidism  Brain lesion  Heart murmur  Peripheral neuropathy  Anxiety  Spinal stenosis  HTN (hypertension)  Hyperlipidemia  DM (diabetes mellitus), type 2  S/P laminectomy with spinal fusion  H/O mitral valve repair  S/P tonsillectomy  Hx of appendectomy  H/O skin graft    FAMILY HISTORY:  Family history of other heart disease (Father, Mother)    Allergies    contrast media (iodine-based) ([place]ushing)  Raw green pepper (Other)    Intolerances        SHx - No smoking, No ETOH, No drug abuse      Review of Systems:  CONSTITUTIONAL:  No weight loss, fever, chills, weakness or fatigue.  HEENT:  Eyes:  No visual loss, blurred vision, double vision or yellow sclerae. Ears, Nose, Throat:  No hearing loss, sneezing, congestion, runny nose or sore throat.  SKIN:  No rash or itching.  CARDIOVASCULAR:  No chest pain, chest pressure or chest discomfort. No palpitations or edema.  RESPIRATORY:  No shortness of breath, cough or sputum.  GASTROINTESTINAL:  No anorexia, nausea, vomiting or diarrhea. No abdominal pain or blood.  GENITOURINARY:  NO Burning on urination.   NEUROLOGICAL: See HPI  MUSCULOSKELETAL:  No muscle, back pain, joint pain or stiffness.  HEMATOLOGIC:  No anemia, bleeding or bruising.  LYMPHATICS:  No enlarged nodes. No history of splenectomy.  PSYCHIATRIC:  No history of depression or anxiety.  ENDOCRINOLOGIC:  No reports of sweating, cold or heat intolerance. No polyuria or polydipsia.  ALLERGIES:  No history of asthma, hives, eczema or rhinitis.        Vital Signs Last 24 Hrs  T(C): 36.8 (02 Nov 2017 08:41), Max: 37.1 (02 Nov 2017 06:49)  T(F): 98.3 (02 Nov 2017 08:41), Max: 98.8 (02 Nov 2017 06:49)  HR: 79 (02 Nov 2017 08:41) (57 - 86)  BP: 153/87 (02 Nov 2017 08:41) (123/73 - 158/70)  BP(mean): --  RR: 18 (02 Nov 2017 08:41) (15 - 18)  SpO2: 99% (02 Nov 2017 08:41) (99% - 100%)    General Exam:   General appearance: No acute distress                   Neurological Exam:  Mental Status: Orientated to self, date and place.  Attention intact.  No dysarthria, aphasia or neglect.  Knowledge intact.  Registration intact.  Short and long term memory grossly intact.      Cranial Nerves: CN I - not tested.  PERRL, EOMI, VFF, no nystagmus or diplopia.  No APD.  Fundi not visualized bilaterally.  CN V1-3 intact to light touch and pinprick.  No facial asymmetry.  Hearing intact to finger rub bilaterally.  Tongue, uvula and palate midline.  Sternocleidomastoid and Trapezius intact bilaterally.    Motor:   Tone: normal.                  Strength:     [] Upper extremity                      Delt       Bicep    Tricep                                                  R         5/5        5/5        5/5       5/5                                               L          5/5        5/5        5/5       5/5  [] Lower extremity                       HF          KE          KF        DF         PF                                               R        5/5 5/5 5/5 5/5 5/5                                               L         5/5 5/5 5/5 5/5 5/5  Pronator drift: none                 Dysmetria: None to finger-nose-finger or heel-shin-heel  No truncal ataxia.    Tremor: No resting, postural or action tremor.  No myoclonus.    Sensation: intact to light touch, pinprick, vibration and proprioception    Deep Tendon Reflexes: 1+ bilateral biceps, triceps, brachioradialis, knee and ankle  Toes flexor bilaterally        Other:    11-02    143  |  102  |  16  ----------------------------<  102<H>  4.4   |  31  |  0.63    Ca    8.8      02 Nov 2017 04:30    TPro  6.3  /  Alb  3.9  /  TBili  0.4  /  DBili  x   /  AST  40  /  ALT  23  /  AlkPhos  49  11-02 11-02    143  |  102  |  16  ----------------------------<  102<H>  4.4   |  31  |  0.63    Ca    8.8      02 Nov 2017 04:30    TPro  6.3  /  Alb  3.9  /  TBili  0.4  /  DBili  x   /  AST  40  /  ALT  23  /  AlkPhos  49  11-02                          8.2    9.75  )-----------( 249      ( 02 Nov 2017 11:00 )             26.2       Radiology    CT  < from: CT Head No Cont (11.02.17 @ 09:33) >  Right occipital lobe age-indeterminate infarct which appears subacute to   long-standing due to dilatation of the adjacent occipital horn. MR of the   brain advised as clinically warranted for further evaluation.    < end of copied text >

## 2017-11-02 NOTE — H&P ADULT - PMH
Acquired hypothyroidism    Anxiety    Brain lesion    Coronary artery disease involving native coronary artery of native heart without angina pectoris    DM (diabetes mellitus), type 2    Heart murmur    HTN (hypertension)    Hyperlipidemia    Peripheral neuropathy    Spinal stenosis

## 2017-11-02 NOTE — CONSULT NOTE ADULT - ASSESSMENT
67yo male presents with global weakness from wound bleeding. Chronic stroke on CT head. No deficits on exam

## 2017-11-02 NOTE — H&P ADULT - PROBLEM SELECTOR PLAN 2
Reports baseline thalassemia trait with Hb 10-12, s/p multiple days of epistaxis and packing, no current bleeding, pt stopped plavix due to bleeding Reports baseline thalassemia trait with Hb 10-12, s/p multiple days of epistaxis and packing, no current bleeding, pt stopped plavix due to bleeding  -trend H/H  -if orthostatic can consider tx  -monitor for recurrence of nasal bleeding

## 2017-11-02 NOTE — ED PROVIDER NOTE - ATTENDING CONTRIBUTION TO CARE
Pertinent PMH/PSH/FHx/SHx and Review of Systems contained within:  Performed H&P in conjunction with resident at bedside. His documented HPI/PE/ROS and MDM is based on our joint findings and discussion.  -Dr. Lucas

## 2017-11-02 NOTE — H&P ADULT - NSHPSOCIALHISTORY_GEN_ALL_CORE
smoked 1/2 ppd x 10 years, quit 1983  occasional EtOh, 3 drinks per month  no drugs  lives alone, not

## 2017-11-02 NOTE — H&P ADULT - NSHPLABSRESULTS_GEN_ALL_CORE
LABS:                        8.2    9.75  )-----------( 249      ( 2017 11:00 )             26.2     11    143  |  102  |  16  ----------------------------<  102<H>  4.4   |  31  |  0.63    Ca    8.8      2017 04:30    TPro  6.3  /  Alb  3.9  /  TBili  0.4  /  DBili  x   /  AST  40  /  ALT  23  /  AlkPhos  49  1102      PT/INR - ( 2017 04:30 )   PT: 13.1 SEC;   INR: 1.17     PTT - ( 2017 04:30 )  PTT:31.2 SEC    CARDIAC MARKERS ( 2017 04:30 )  x     / < 0.06 ng/mL / 173 u/L / 4.53 ng/mL / x          Urinalysis Basic - ( 2017 05:15 )    Color: YELLOW / Appearance: CLEAR / S.024 / pH: 6.0  Gluc: NEGATIVE / Ketone: NEGATIVE  / Bili: NEGATIVE / Urobili: 1 E.U.   Blood: NEGATIVE / Protein: 20 / Nitrite: NEGATIVE   Leuk Esterase: NEGATIVE / RBC: 5-10 / WBC 0-2   Sq Epi: x / Non Sq Epi: x / Bacteria: x    VBG  @ 04:30  pH: 7.39/pCO2: 55 /pO2: 50/HCO3: 31/lactate: 1.0    EK-st degree av block, sinus 84, poor baseline LABS:                        8.2    9.75  )-----------( 249      ( 2017 11:00 )             26.2     11    143  |  102  |  16  ----------------------------<  102<H>  4.4   |  31  |  0.63    Ca    8.8      2017 04:30    TPro  6.3  /  Alb  3.9  /  TBili  0.4  /  DBili  x   /  AST  40  /  ALT  23  /  AlkPhos  49  11      PT/INR - ( 2017 04:30 )   PT: 13.1 SEC;   INR: 1.17     PTT - ( 2017 04:30 )  PTT:31.2 SEC    CARDIAC MARKERS ( 2017 04:30 )  x     / < 0.06 ng/mL / 173 u/L / 4.53 ng/mL / x          Urinalysis Basic - ( 2017 05:15 )    Color: YELLOW / Appearance: CLEAR / S.024 / pH: 6.0  Gluc: NEGATIVE / Ketone: NEGATIVE  / Bili: NEGATIVE / Urobili: 1 E.U.   Blood: NEGATIVE / Protein: 20 / Nitrite: NEGATIVE   Leuk Esterase: NEGATIVE / RBC: 5-10 / WBC 0-2   Sq Epi: x / Non Sq Epi: x / Bacteria: x    VBG  @ 04:30  pH: 7.39/pCO2: 55 /pO2: 50/HCO3: 31/lactate: 1.0    EK-st degree av block, sinus 84, poor baseline    Imaging reviewed   CXR: no acute pulmonary disease  CTH: subacute/old R Occipical lobe stroke

## 2017-11-03 LAB
BUN SERPL-MCNC: 10 MG/DL — SIGNIFICANT CHANGE UP (ref 7–23)
CALCIUM SERPL-MCNC: 8.7 MG/DL — SIGNIFICANT CHANGE UP (ref 8.4–10.5)
CHLORIDE SERPL-SCNC: 98 MMOL/L — SIGNIFICANT CHANGE UP (ref 98–107)
CO2 SERPL-SCNC: 32 MMOL/L — HIGH (ref 22–31)
CREAT SERPL-MCNC: 0.59 MG/DL — SIGNIFICANT CHANGE UP (ref 0.5–1.3)
GLUCOSE BLDC GLUCOMTR-MCNC: 109 MG/DL — HIGH (ref 70–99)
GLUCOSE BLDC GLUCOMTR-MCNC: 119 MG/DL — HIGH (ref 70–99)
GLUCOSE BLDC GLUCOMTR-MCNC: 129 MG/DL — HIGH (ref 70–99)
GLUCOSE BLDC GLUCOMTR-MCNC: 159 MG/DL — HIGH (ref 70–99)
GLUCOSE SERPL-MCNC: 87 MG/DL — SIGNIFICANT CHANGE UP (ref 70–99)
HBA1C BLD-MCNC: 5.5 % — SIGNIFICANT CHANGE UP (ref 4–5.6)
HCT VFR BLD CALC: 28.1 % — LOW (ref 39–50)
HGB BLD-MCNC: 8.6 G/DL — LOW (ref 13–17)
MCHC RBC-ENTMCNC: 18.7 PG — LOW (ref 27–34)
MCHC RBC-ENTMCNC: 30.6 % — LOW (ref 32–36)
MCV RBC AUTO: 61.2 FL — LOW (ref 80–100)
NRBC # FLD: 0 — SIGNIFICANT CHANGE UP
PLATELET # BLD AUTO: 240 K/UL — SIGNIFICANT CHANGE UP (ref 150–400)
PMV BLD: 11 FL — SIGNIFICANT CHANGE UP (ref 7–13)
POTASSIUM SERPL-MCNC: 3.5 MMOL/L — SIGNIFICANT CHANGE UP (ref 3.5–5.3)
POTASSIUM SERPL-SCNC: 3.5 MMOL/L — SIGNIFICANT CHANGE UP (ref 3.5–5.3)
RBC # BLD: 4.59 M/UL — SIGNIFICANT CHANGE UP (ref 4.2–5.8)
RBC # FLD: 16.8 % — HIGH (ref 10.3–14.5)
SODIUM SERPL-SCNC: 140 MMOL/L — SIGNIFICANT CHANGE UP (ref 135–145)
SPECIMEN SOURCE: SIGNIFICANT CHANGE UP
WBC # BLD: 10.15 K/UL — SIGNIFICANT CHANGE UP (ref 3.8–10.5)
WBC # FLD AUTO: 10.15 K/UL — SIGNIFICANT CHANGE UP (ref 3.8–10.5)

## 2017-11-03 RX ADMIN — GABAPENTIN 200 MILLIGRAM(S): 400 CAPSULE ORAL at 13:22

## 2017-11-03 RX ADMIN — TAMSULOSIN HYDROCHLORIDE 0.4 MILLIGRAM(S): 0.4 CAPSULE ORAL at 22:24

## 2017-11-03 RX ADMIN — Medication 112 MICROGRAM(S): at 05:51

## 2017-11-03 RX ADMIN — Medication 500 MILLIGRAM(S): at 13:22

## 2017-11-03 RX ADMIN — SIMVASTATIN 40 MILLIGRAM(S): 20 TABLET, FILM COATED ORAL at 22:24

## 2017-11-03 RX ADMIN — Medication 50 MILLIGRAM(S): at 05:51

## 2017-11-03 RX ADMIN — Medication 500 MILLIGRAM(S): at 22:24

## 2017-11-03 RX ADMIN — GABAPENTIN 200 MILLIGRAM(S): 400 CAPSULE ORAL at 05:51

## 2017-11-03 RX ADMIN — Medication 500 MILLIGRAM(S): at 05:51

## 2017-11-03 RX ADMIN — Medication 12.5 MILLIGRAM(S): at 05:51

## 2017-11-03 RX ADMIN — Medication 325 MILLIGRAM(S): at 13:22

## 2017-11-03 RX ADMIN — GABAPENTIN 200 MILLIGRAM(S): 400 CAPSULE ORAL at 22:23

## 2017-11-03 RX ADMIN — Medication 81 MILLIGRAM(S): at 13:22

## 2017-11-03 RX ADMIN — Medication 1 TABLET(S): at 17:45

## 2017-11-03 RX ADMIN — Medication 1 TABLET(S): at 13:23

## 2017-11-03 NOTE — PHYSICAL THERAPY INITIAL EVALUATION ADULT - PRECAUTIONS/LIMITATIONS, REHAB EVAL
----- Message from referral department sent at 8/21/2017 10:15 AM CDT -----  [facility]. Fax states:    Has pt. tried conservative tx such as PT, NSAIDS or shoe insert? Why is it not recommended at this time?    Please note I faxed Dr Warren's notes, Dr Gallo's notes, and Dr Henderson's notes to [facility] at [facility]. I do not see any PT info in chart or on specialists notes. Please advise.              fall precautions

## 2017-11-03 NOTE — PROGRESS NOTE ADULT - SUBJECTIVE AND OBJECTIVE BOX
Patient is a 66y old  Male who presents with a chief complaint of couldn't walk or stand, weakness. (02 Nov 2017 23:58)  pt seen and examined at bedside   SUBJECTIVE/OVERNIGHT events : c/o weakness, epistaxis   Vital Signs Last 24 Hrs  T(C): 36.3 (03 Nov 2017 04:43), Max: 37.3 (02 Nov 2017 21:55)  T(F): 97.4 (03 Nov 2017 04:43), Max: 99.1 (02 Nov 2017 21:55)  HR: 71 (03 Nov 2017 04:43) (71 - 80)  BP: 154/81 (03 Nov 2017 04:43) (127/74 - 154/81)  BP(mean): --  RR: 18 (03 Nov 2017 04:43) (16 - 18)  SpO2: 98% (03 Nov 2017 04:43) (97% - 100%)  CAPILLARY BLOOD GLUCOSE  101 (02 Nov 2017 17:05)      POCT Blood Glucose.: 109 mg/dL (03 Nov 2017 08:22)  POCT Blood Glucose.: 89 mg/dL (02 Nov 2017 22:48)  POCT Blood Glucose.: 99 mg/dL (02 Nov 2017 16:23)  POCT Blood Glucose.: 103 mg/dL (02 Nov 2017 14:07)    MEDICATIONS  (STANDING):  ascorbic acid 500 milliGRAM(s) Oral daily  aspirin enteric coated 81 milliGRAM(s) Oral daily  cephalexin 500 milliGRAM(s) Oral every 8 hours  dextrose 50% Injectable 12.5 Gram(s) IV Push once  dextrose 50% Injectable 25 Gram(s) IV Push once  dextrose 50% Injectable 25 Gram(s) IV Push once  ferrous    sulfate 325 milliGRAM(s) Oral with lunch  gabapentin 200 milliGRAM(s) Oral every 8 hours  hydrochlorothiazide 12.5 milliGRAM(s) Oral daily  insulin lispro (HumaLOG) corrective regimen sliding scale   SubCutaneous three times a day before meals  insulin lispro (HumaLOG) corrective regimen sliding scale   SubCutaneous at bedtime  lactobacillus acidophilus 1 Tablet(s) Oral two times a day with meals  levothyroxine 112 MICROGram(s) Oral daily  metoprolol succinate ER 50 milliGRAM(s) Oral daily  simvastatin 40 milliGRAM(s) Oral at bedtime  tamsulosin 0.4 milliGRAM(s) Oral at bedtime    MEDICATIONS  (PRN):  dextrose Gel 1 Dose(s) Oral once PRN Blood Glucose LESS THAN 70 milliGRAM(s)/deciliter  glucagon  Injectable 1 milliGRAM(s) IntraMuscular once PRN Glucose LESS THAN 70 milligrams/deciliter    PHYSICAL EXAM  General : comfortable, not in any acute distress  Neck : supple, no LAD, no JVD  Eyes : EOMI, PERRLA, conjunctiva : no icterus, no pallor  MM moist, no pharyngeal erythema/exudates  Nose: nasal packing present  Pulmonary: clear to auscultation bilateral lung fields, no crackles/rhonchi/wheezing,   CVS : S1 S2,rate normal-,rhythm-regular, no audible murmurs, no abnormal sounds  Gastrointestinal: soft, non tender, non distended, no organomegaly , bowel sounds audible  Extremities: no edema  Neuro: AAOx3, no focal deficits  Skin: no rash  LABS                        8.6    10.15 )-----------( 240      ( 03 Nov 2017 06:00 )             28.1     11-03    140  |  98  |  10  ----------------------------<  87  3.5   |  32<H>  |  0.59    Ca    8.7      03 Nov 2017 06:00    TPro  6.3  /  Alb  3.9  /  TBili  0.4  /  DBili  x   /  AST  40  /  ALT  23  /  AlkPhos  49  11-02      RADIOLOGY and IMAGING reviewed: yes  Consultant notes reviewed : yes  Care discussed with Consultants/other providers :yes

## 2017-11-03 NOTE — PROGRESS NOTE ADULT - PROBLEM SELECTOR PLAN 3
saw ENT Tuesday, due for f/u Friday, packing in place  -resume ppx abx  ENt c/s to remove nasal packing- due to be dced today

## 2017-11-03 NOTE — PHYSICAL THERAPY INITIAL EVALUATION ADULT - PLANNED THERAPY INTERVENTIONS, PT EVAL
balance training/strengthening/transfer training/bed mobility training/stairs training/gait training

## 2017-11-03 NOTE — CONSULT NOTE ADULT - SUBJECTIVE AND OBJECTIVE BOX
66 year old male with CAD and HTN. Patient had several episodes of epistaxis and was packed multiple times by ED and   private ENT. Patient takes Plavix and ASA but the plavix is being held. Was told to come to hospital and get admitted for removal of packing   in case he needed to go to OR after it was removed to control bleeding.      AVSS  HEENT:  Left muricel packing in place. No bleeding   Mouth:  No posterior pharyngeal bleeding.

## 2017-11-04 LAB
BLD GP AB SCN SERPL QL: NEGATIVE — SIGNIFICANT CHANGE UP
BUN SERPL-MCNC: 12 MG/DL — SIGNIFICANT CHANGE UP (ref 7–23)
CALCIUM SERPL-MCNC: 8.6 MG/DL — SIGNIFICANT CHANGE UP (ref 8.4–10.5)
CHLORIDE SERPL-SCNC: 98 MMOL/L — SIGNIFICANT CHANGE UP (ref 98–107)
CO2 SERPL-SCNC: 31 MMOL/L — SIGNIFICANT CHANGE UP (ref 22–31)
CREAT SERPL-MCNC: 0.67 MG/DL — SIGNIFICANT CHANGE UP (ref 0.5–1.3)
GLUCOSE BLDC GLUCOMTR-MCNC: 103 MG/DL — HIGH (ref 70–99)
GLUCOSE BLDC GLUCOMTR-MCNC: 108 MG/DL — HIGH (ref 70–99)
GLUCOSE BLDC GLUCOMTR-MCNC: 124 MG/DL — HIGH (ref 70–99)
GLUCOSE BLDC GLUCOMTR-MCNC: 138 MG/DL — HIGH (ref 70–99)
GLUCOSE SERPL-MCNC: 95 MG/DL — SIGNIFICANT CHANGE UP (ref 70–99)
HCT VFR BLD CALC: 29.7 % — LOW (ref 39–50)
HGB BLD-MCNC: 9.1 G/DL — LOW (ref 13–17)
MCHC RBC-ENTMCNC: 18.6 PG — LOW (ref 27–34)
MCHC RBC-ENTMCNC: 30.6 % — LOW (ref 32–36)
MCV RBC AUTO: 60.6 FL — LOW (ref 80–100)
NRBC # FLD: 0 — SIGNIFICANT CHANGE UP
PLATELET # BLD AUTO: 227 K/UL — SIGNIFICANT CHANGE UP (ref 150–400)
PMV BLD: SIGNIFICANT CHANGE UP FL (ref 7–13)
POTASSIUM SERPL-MCNC: 3.7 MMOL/L — SIGNIFICANT CHANGE UP (ref 3.5–5.3)
POTASSIUM SERPL-SCNC: 3.7 MMOL/L — SIGNIFICANT CHANGE UP (ref 3.5–5.3)
RBC # BLD: 4.9 M/UL — SIGNIFICANT CHANGE UP (ref 4.2–5.8)
RBC # FLD: 17 % — HIGH (ref 10.3–14.5)
RH IG SCN BLD-IMP: POSITIVE — SIGNIFICANT CHANGE UP
SODIUM SERPL-SCNC: 140 MMOL/L — SIGNIFICANT CHANGE UP (ref 135–145)
WBC # BLD: 10.18 K/UL — SIGNIFICANT CHANGE UP (ref 3.8–10.5)
WBC # FLD AUTO: 10.18 K/UL — SIGNIFICANT CHANGE UP (ref 3.8–10.5)

## 2017-11-04 RX ORDER — ACETAMINOPHEN 500 MG
650 TABLET ORAL EVERY 6 HOURS
Qty: 0 | Refills: 0 | Status: DISCONTINUED | OUTPATIENT
Start: 2017-11-04 | End: 2017-11-08

## 2017-11-04 RX ORDER — OXYMETAZOLINE HYDROCHLORIDE 0.5 MG/ML
1 SPRAY NASAL
Qty: 0 | Refills: 0 | Status: DISCONTINUED | OUTPATIENT
Start: 2017-11-04 | End: 2017-11-04

## 2017-11-04 RX ORDER — OXYMETAZOLINE HYDROCHLORIDE 0.5 MG/ML
1 SPRAY NASAL
Qty: 0 | Refills: 0 | Status: DISCONTINUED | OUTPATIENT
Start: 2017-11-04 | End: 2017-11-08

## 2017-11-04 RX ORDER — SODIUM CHLORIDE 0.65 %
1 AEROSOL, SPRAY (ML) NASAL EVERY 4 HOURS
Qty: 0 | Refills: 0 | Status: DISCONTINUED | OUTPATIENT
Start: 2017-11-04 | End: 2017-11-08

## 2017-11-04 RX ADMIN — Medication 325 MILLIGRAM(S): at 11:42

## 2017-11-04 RX ADMIN — Medication 500 MILLIGRAM(S): at 23:29

## 2017-11-04 RX ADMIN — SIMVASTATIN 40 MILLIGRAM(S): 20 TABLET, FILM COATED ORAL at 23:29

## 2017-11-04 RX ADMIN — Medication 1 TABLET(S): at 06:43

## 2017-11-04 RX ADMIN — Medication 112 MICROGRAM(S): at 06:42

## 2017-11-04 RX ADMIN — Medication 650 MILLIGRAM(S): at 18:22

## 2017-11-04 RX ADMIN — Medication 500 MILLIGRAM(S): at 15:25

## 2017-11-04 RX ADMIN — Medication 1 SPRAY(S): at 18:20

## 2017-11-04 RX ADMIN — Medication 50 MILLIGRAM(S): at 06:43

## 2017-11-04 RX ADMIN — GABAPENTIN 200 MILLIGRAM(S): 400 CAPSULE ORAL at 06:42

## 2017-11-04 RX ADMIN — TAMSULOSIN HYDROCHLORIDE 0.4 MILLIGRAM(S): 0.4 CAPSULE ORAL at 23:29

## 2017-11-04 RX ADMIN — Medication 81 MILLIGRAM(S): at 11:42

## 2017-11-04 RX ADMIN — GABAPENTIN 200 MILLIGRAM(S): 400 CAPSULE ORAL at 23:29

## 2017-11-04 RX ADMIN — Medication 500 MILLIGRAM(S): at 06:42

## 2017-11-04 RX ADMIN — Medication 1 SPRAY(S): at 23:29

## 2017-11-04 RX ADMIN — Medication 500 MILLIGRAM(S): at 11:42

## 2017-11-04 RX ADMIN — Medication 12.5 MILLIGRAM(S): at 06:42

## 2017-11-04 RX ADMIN — Medication 1 TABLET(S): at 17:31

## 2017-11-04 RX ADMIN — Medication 650 MILLIGRAM(S): at 17:31

## 2017-11-04 RX ADMIN — GABAPENTIN 200 MILLIGRAM(S): 400 CAPSULE ORAL at 15:25

## 2017-11-04 NOTE — PROGRESS NOTE ADULT - SUBJECTIVE AND OBJECTIVE BOX
Patient seen and examined. No additional episodes of bleeding from left nare. Denies postnasal drip. No acute events overnight.    Phys Exam  NAD, awake, cooperative  Breathing comfortably on room air, no stridor  Nose: left nare with thick white mucoid discharge and mercocel in place s/p removal and placement of afrin coated surgicel (see procedure portion for further details) left nasal mucosa appears friable but no active bleeding identified, right nare clear with no evidence of bleeding  OC/OP: clear, no bleeding in posterior OP  Neck soft, flat    Procedure: saline instilled around left mercocel and left merocel removed atraumatically. Using a headlight and nasal speculum for visualization the nasal mucosa was carefully inspected. No active source of bleeding was identified, however nasal mucosa appeared friable. Surgicel soaked in afrin applied to suspicious areas and left in place. Procedure was well tolerated and uncomplicated.

## 2017-11-04 NOTE — PROGRESS NOTE ADULT - SUBJECTIVE AND OBJECTIVE BOX
Patient is a 66y old  Male who presents with a chief complaint of couldn't walk or stand, weakness. (02 Nov 2017 23:58)  pt seen and examined at bedside   SUBJECTIVE/OVERNIGHT events   no furthur epistaxis, ent at bedside taking the packing off, still feels weak-generalised  Vital Signs Last 24 Hrs  T(C): 36.7 (04 Nov 2017 05:43), Max: 37.7 (03 Nov 2017 21:48)  T(F): 98 (04 Nov 2017 05:43), Max: 99.8 (03 Nov 2017 21:48)  HR: 78 (04 Nov 2017 05:43) (72 - 78)  BP: 129/78 (04 Nov 2017 05:43) (100/53 - 142/79)  BP(mean): --  RR: 18 (04 Nov 2017 05:43) (18 - 18)  SpO2: 98% (04 Nov 2017 05:43) (96% - 98%)  CAPILLARY BLOOD GLUCOSE      POCT Blood Glucose.: 108 mg/dL (04 Nov 2017 08:47)  POCT Blood Glucose.: 159 mg/dL (03 Nov 2017 22:24)  POCT Blood Glucose.: 119 mg/dL (03 Nov 2017 18:08)  POCT Blood Glucose.: 129 mg/dL (03 Nov 2017 12:44)    MEDICATIONS  (STANDING):  ascorbic acid 500 milliGRAM(s) Oral daily  aspirin enteric coated 81 milliGRAM(s) Oral daily  cephalexin 500 milliGRAM(s) Oral every 8 hours  dextrose 50% Injectable 12.5 Gram(s) IV Push once  dextrose 50% Injectable 25 Gram(s) IV Push once  dextrose 50% Injectable 25 Gram(s) IV Push once  ferrous    sulfate 325 milliGRAM(s) Oral with lunch  gabapentin 200 milliGRAM(s) Oral every 8 hours  hydrochlorothiazide 12.5 milliGRAM(s) Oral daily  insulin lispro (HumaLOG) corrective regimen sliding scale   SubCutaneous three times a day before meals  insulin lispro (HumaLOG) corrective regimen sliding scale   SubCutaneous at bedtime  lactobacillus acidophilus 1 Tablet(s) Oral two times a day with meals  levothyroxine 112 MICROGram(s) Oral daily  metoprolol succinate ER 50 milliGRAM(s) Oral daily  simvastatin 40 milliGRAM(s) Oral at bedtime  tamsulosin 0.4 milliGRAM(s) Oral at bedtime    MEDICATIONS  (PRN):  dextrose Gel 1 Dose(s) Oral once PRN Blood Glucose LESS THAN 70 milliGRAM(s)/deciliter  glucagon  Injectable 1 milliGRAM(s) IntraMuscular once PRN Glucose LESS THAN 70 milligrams/deciliter    PHYSICAL EXAM  General : comfortable, not in any acute distress  Neck : supple, no LAD, no JVD  Eyes : EOMI, PERRLA, conjunctiva : no icterus, no pallor  MM moist, no pharyngeal erythema/exudates  Pulmonary: clear to auscultation bilateral lung fields, no crackles/rhonchi/wheezing,   CVS : S1 S2,rate normal-,rhythm-regular, no audible murmurs, no abnormal sounds  Gastrointestinal: soft, non tender, non distended, no organomegaly , bowel sounds audible  Extremities: no edema  Neuro: AAOx3, no new focal deficits  Musculoskeletal: no obvious limitations, FROM of all ext  Skin: no rash  LABS                        9.1    10.18 )-----------( 227      ( 04 Nov 2017 05:45 )             29.7     11-04    140  |  98  |  12  ----------------------------<  95  3.7   |  31  |  0.67    Ca    8.6      04 Nov 2017 05:45        Culture - Urine (collected 02 Nov 2017 07:45)  Source: URINE MIDSTREAM      RADIOLOGY and IMAGING reviewed: yes  Consultant notes reviewed : yes  Care discussed with Consultants/other providers :yes -ENT

## 2017-11-05 ENCOUNTER — TRANSCRIPTION ENCOUNTER (OUTPATIENT)
Age: 67
End: 2017-11-05

## 2017-11-05 LAB
BUN SERPL-MCNC: 11 MG/DL — SIGNIFICANT CHANGE UP (ref 7–23)
CALCIUM SERPL-MCNC: 8.6 MG/DL — SIGNIFICANT CHANGE UP (ref 8.4–10.5)
CHLORIDE SERPL-SCNC: 97 MMOL/L — LOW (ref 98–107)
CO2 SERPL-SCNC: 31 MMOL/L — SIGNIFICANT CHANGE UP (ref 22–31)
CREAT SERPL-MCNC: 0.58 MG/DL — SIGNIFICANT CHANGE UP (ref 0.5–1.3)
GLUCOSE BLDC GLUCOMTR-MCNC: 106 MG/DL — HIGH (ref 70–99)
GLUCOSE BLDC GLUCOMTR-MCNC: 109 MG/DL — HIGH (ref 70–99)
GLUCOSE BLDC GLUCOMTR-MCNC: 118 MG/DL — HIGH (ref 70–99)
GLUCOSE BLDC GLUCOMTR-MCNC: 173 MG/DL — HIGH (ref 70–99)
GLUCOSE SERPL-MCNC: 82 MG/DL — SIGNIFICANT CHANGE UP (ref 70–99)
HCT VFR BLD CALC: 30.9 % — LOW (ref 39–50)
HGB BLD-MCNC: 9.4 G/DL — LOW (ref 13–17)
MCHC RBC-ENTMCNC: 18.7 PG — LOW (ref 27–34)
MCHC RBC-ENTMCNC: 30.4 % — LOW (ref 32–36)
MCV RBC AUTO: 61.4 FL — LOW (ref 80–100)
NRBC # FLD: 0 — SIGNIFICANT CHANGE UP
PLATELET # BLD AUTO: 267 K/UL — SIGNIFICANT CHANGE UP (ref 150–400)
PMV BLD: SIGNIFICANT CHANGE UP FL (ref 7–13)
POTASSIUM SERPL-MCNC: 3.6 MMOL/L — SIGNIFICANT CHANGE UP (ref 3.5–5.3)
POTASSIUM SERPL-SCNC: 3.6 MMOL/L — SIGNIFICANT CHANGE UP (ref 3.5–5.3)
RBC # BLD: 5.03 M/UL — SIGNIFICANT CHANGE UP (ref 4.2–5.8)
RBC # FLD: 17.6 % — HIGH (ref 10.3–14.5)
SODIUM SERPL-SCNC: 138 MMOL/L — SIGNIFICANT CHANGE UP (ref 135–145)
WBC # BLD: 11.5 K/UL — HIGH (ref 3.8–10.5)
WBC # FLD AUTO: 11.5 K/UL — HIGH (ref 3.8–10.5)

## 2017-11-05 RX ORDER — LOSARTAN POTASSIUM 100 MG/1
25 TABLET, FILM COATED ORAL DAILY
Qty: 0 | Refills: 0 | Status: DISCONTINUED | OUTPATIENT
Start: 2017-11-05 | End: 2017-11-08

## 2017-11-05 RX ORDER — CLOPIDOGREL BISULFATE 75 MG/1
75 TABLET, FILM COATED ORAL DAILY
Qty: 0 | Refills: 0 | Status: DISCONTINUED | OUTPATIENT
Start: 2017-11-05 | End: 2017-11-08

## 2017-11-05 RX ADMIN — CLOPIDOGREL BISULFATE 75 MILLIGRAM(S): 75 TABLET, FILM COATED ORAL at 16:17

## 2017-11-05 RX ADMIN — Medication 12.5 MILLIGRAM(S): at 07:18

## 2017-11-05 RX ADMIN — Medication 325 MILLIGRAM(S): at 11:10

## 2017-11-05 RX ADMIN — Medication 50 MILLIGRAM(S): at 07:18

## 2017-11-05 RX ADMIN — Medication 1 SPRAY(S): at 11:12

## 2017-11-05 RX ADMIN — Medication 112 MICROGRAM(S): at 07:18

## 2017-11-05 RX ADMIN — Medication 1 SPRAY(S): at 18:57

## 2017-11-05 RX ADMIN — Medication 1 SPRAY(S): at 16:18

## 2017-11-05 RX ADMIN — TAMSULOSIN HYDROCHLORIDE 0.4 MILLIGRAM(S): 0.4 CAPSULE ORAL at 23:35

## 2017-11-05 RX ADMIN — Medication 1 SPRAY(S): at 23:34

## 2017-11-05 RX ADMIN — Medication 650 MILLIGRAM(S): at 18:57

## 2017-11-05 RX ADMIN — Medication 10 MILLIGRAM(S): at 11:11

## 2017-11-05 RX ADMIN — Medication 1 TABLET(S): at 07:18

## 2017-11-05 RX ADMIN — GABAPENTIN 200 MILLIGRAM(S): 400 CAPSULE ORAL at 16:18

## 2017-11-05 RX ADMIN — Medication 1 TABLET(S): at 18:57

## 2017-11-05 RX ADMIN — Medication 500 MILLIGRAM(S): at 11:11

## 2017-11-05 RX ADMIN — Medication 81 MILLIGRAM(S): at 11:11

## 2017-11-05 RX ADMIN — Medication 500 MILLIGRAM(S): at 23:36

## 2017-11-05 RX ADMIN — Medication 1 SPRAY(S): at 07:18

## 2017-11-05 RX ADMIN — SIMVASTATIN 40 MILLIGRAM(S): 20 TABLET, FILM COATED ORAL at 23:34

## 2017-11-05 RX ADMIN — GABAPENTIN 200 MILLIGRAM(S): 400 CAPSULE ORAL at 23:35

## 2017-11-05 RX ADMIN — Medication 500 MILLIGRAM(S): at 16:18

## 2017-11-05 RX ADMIN — Medication 1 SPRAY(S): at 02:31

## 2017-11-05 RX ADMIN — Medication 500 MILLIGRAM(S): at 07:18

## 2017-11-05 RX ADMIN — GABAPENTIN 200 MILLIGRAM(S): 400 CAPSULE ORAL at 07:17

## 2017-11-05 NOTE — DISCHARGE NOTE ADULT - SECONDARY DIAGNOSIS.
Anemia due to blood loss Epistaxis Surgical wound, non healing, initial encounter Essential hypertension Acquired hypothyroidism Coronary artery disease involving native coronary artery of native heart without angina pectoris

## 2017-11-05 NOTE — PROGRESS NOTE ADULT - SUBJECTIVE AND OBJECTIVE BOX
Patient is a 66y old  Male who presents with a chief complaint of couldn't walk or stand, weakness. (02 Nov 2017 23:58)  pt seen and examined at bedside   SUBJECTIVE/OVERNIGHT events no furthur epistaxis, feels well  Vital Signs Last 24 Hrs  T(C): 36.7 (05 Nov 2017 06:04), Max: 36.8 (04 Nov 2017 22:22)  T(F): 98.1 (05 Nov 2017 06:04), Max: 98.3 (04 Nov 2017 22:22)  HR: 75 (05 Nov 2017 06:04) (72 - 80)  BP: 142/92 (05 Nov 2017 06:04) (118/72 - 158/91)  BP(mean): --  RR: 18 (05 Nov 2017 06:04) (17 - 18)  SpO2: 98% (05 Nov 2017 06:04) (98% - 99%)  CAPILLARY BLOOD GLUCOSE      POCT Blood Glucose.: 118 mg/dL (05 Nov 2017 12:39)  POCT Blood Glucose.: 106 mg/dL (05 Nov 2017 08:47)  POCT Blood Glucose.: 138 mg/dL (04 Nov 2017 21:49)  POCT Blood Glucose.: 103 mg/dL (04 Nov 2017 17:58)    MEDICATIONS  (STANDING):  ascorbic acid 500 milliGRAM(s) Oral daily  aspirin enteric coated 81 milliGRAM(s) Oral daily  cephalexin 500 milliGRAM(s) Oral every 8 hours  clopidogrel Tablet 75 milliGRAM(s) Oral daily  dextrose 50% Injectable 12.5 Gram(s) IV Push once  dextrose 50% Injectable 25 Gram(s) IV Push once  dextrose 50% Injectable 25 Gram(s) IV Push once  ferrous    sulfate 325 milliGRAM(s) Oral with lunch  gabapentin 200 milliGRAM(s) Oral every 8 hours  hydrochlorothiazide 12.5 milliGRAM(s) Oral daily  insulin lispro (HumaLOG) corrective regimen sliding scale   SubCutaneous three times a day before meals  insulin lispro (HumaLOG) corrective regimen sliding scale   SubCutaneous at bedtime  lactobacillus acidophilus 1 Tablet(s) Oral two times a day with meals  levothyroxine 112 MICROGram(s) Oral daily  losartan 25 milliGRAM(s) Oral daily  metoprolol succinate ER 50 milliGRAM(s) Oral daily  simvastatin 40 milliGRAM(s) Oral at bedtime  sodium chloride 0.65% Nasal 1 Spray(s) Both Nostrils every 4 hours  tamsulosin 0.4 milliGRAM(s) Oral at bedtime    MEDICATIONS  (PRN):  acetaminophen   Tablet. 650 milliGRAM(s) Oral every 6 hours PRN Moderate and Severe Pain  bisacodyl Suppository 10 milliGRAM(s) Rectal daily PRN Constipation  dextrose Gel 1 Dose(s) Oral once PRN Blood Glucose LESS THAN 70 milliGRAM(s)/deciliter  glucagon  Injectable 1 milliGRAM(s) IntraMuscular once PRN Glucose LESS THAN 70 milligrams/deciliter  oxymetazoline 0.05% Nasal Spray 1 Spray(s) Left Nostril two times a day PRN nasal bleeding    PHYSICAL EXAM  General : comfortable, not in any acute distress  Neck : supple, no LAD, no JVD  Eyes : EOMI, PERRLA, conjunctiva : no icterus, no pallor  MM moist, no pharyngeal erythema/exudates  Pulmonary: clear to auscultation bilateral lung fields, no crackles/rhonchi/wheezing,   CVS : S1 S2,rate normal-,rhythm-regular, no audible murmurs, no abnormal sounds  Gastrointestinal: soft, non tender, non distended, no organomegaly , bowel sounds audible  Extremities: no edema  Neuro: AAOx3, no focal deficits  Musculoskeletal: no obvious limitations, FROM of all ext  Skin: no rash  LABS                        9.4    11.50 )-----------( 267      ( 05 Nov 2017 07:00 )             30.9     11-05    138  |  97<L>  |  11  ----------------------------<  82  3.6   |  31  |  0.58    Ca    8.6      05 Nov 2017 07:00        RADIOLOGY and IMAGING reviewed: yes  Consultant notes reviewed : yes  Care discussed with Consultants/other providers :

## 2017-11-05 NOTE — DISCHARGE NOTE ADULT - PLAN OF CARE
Improvement in mobility status continue physical therapy at Rehab facility   follow up with PCP at Rehab Facility hemoglobin remain stable without reoccurrence Notify your doctor if you are experiencing any reoccurrence of nasal bleed progressive wound healing, free from infection continue wound vac to upper back wound. Wound Vac dsg with Black Granufoam, at 125mmHg continuous pressure. Wound Vac to be changed 3x weekly on Monday, Wednesday, and Friday. SBP<140 continue blood pressure medications as ordered remain stable continue synthroid Remain stable continue Aspirin and Plavix Stable continue wound vac to upper back wound. Wound Vac dsg with Black Granufoam, at 125mmHg continuous pressure. Wound Vac to be changed 3x weekly on Monday, Wednesday, and Friday. Please follow up with  your surgeon Dr Carias  once discharged from rehab

## 2017-11-05 NOTE — DISCHARGE NOTE ADULT - MEDICATION SUMMARY - MEDICATIONS TO STOP TAKING
I will STOP taking the medications listed below when I get home from the hospital:    cephalexin 500 mg oral tablet  -- 1 tab(s) by mouth every 8 hours

## 2017-11-05 NOTE — DISCHARGE NOTE ADULT - CARE PLAN
Principal Discharge DX:	Weakness  Goal:	Improvement in mobility status  Instructions for follow-up, activity and diet:	continue physical therapy at Rehab facility   follow up with PCP at Rehab Facility  Secondary Diagnosis:	Anemia due to blood loss  Goal:	hemoglobin remain stable  Secondary Diagnosis:	Epistaxis  Goal:	without reoccurrence  Instructions for follow-up, activity and diet:	Notify your doctor if you are experiencing any reoccurrence of nasal bleed  Secondary Diagnosis:	Surgical wound, non healing, initial encounter  Goal:	progressive wound healing, free from infection  Instructions for follow-up, activity and diet:	continue wound vac to upper back wound. Wound Vac dsg with Black Granufoam, at 125mmHg continuous pressure. Wound Vac to be changed 3x weekly on Monday, Wednesday, and Friday.  Secondary Diagnosis:	Essential hypertension  Goal:	SBP<140  Instructions for follow-up, activity and diet:	continue blood pressure medications as ordered  Secondary Diagnosis:	Acquired hypothyroidism  Goal:	remain stable  Instructions for follow-up, activity and diet:	continue synthroid  Secondary Diagnosis:	Coronary artery disease involving native coronary artery of native heart without angina pectoris  Goal:	Remain stable  Instructions for follow-up, activity and diet:	continue Aspirin and Plavix Principal Discharge DX:	Weakness  Goal:	Improvement in mobility status  Instructions for follow-up, activity and diet:	continue physical therapy at Rehab facility   follow up with PCP at Rehab Facility  Secondary Diagnosis:	Anemia due to blood loss  Goal:	hemoglobin remain stable  Instructions for follow-up, activity and diet:	Stable  Secondary Diagnosis:	Epistaxis  Goal:	without reoccurrence  Instructions for follow-up, activity and diet:	Notify your doctor if you are experiencing any reoccurrence of nasal bleed  Secondary Diagnosis:	Surgical wound, non healing, initial encounter  Goal:	progressive wound healing, free from infection  Instructions for follow-up, activity and diet:	continue wound vac to upper back wound. Wound Vac dsg with Black Granufoam, at 125mmHg continuous pressure. Wound Vac to be changed 3x weekly on Monday, Wednesday, and Friday.  Secondary Diagnosis:	Essential hypertension  Goal:	SBP<140  Instructions for follow-up, activity and diet:	continue blood pressure medications as ordered  Secondary Diagnosis:	Acquired hypothyroidism  Goal:	remain stable  Instructions for follow-up, activity and diet:	continue synthroid  Secondary Diagnosis:	Coronary artery disease involving native coronary artery of native heart without angina pectoris  Goal:	Remain stable  Instructions for follow-up, activity and diet:	continue Aspirin and Plavix Principal Discharge DX:	Weakness  Goal:	Improvement in mobility status  Instructions for follow-up, activity and diet:	continue physical therapy at Rehab facility   follow up with PCP at Rehab Facility  Secondary Diagnosis:	Anemia due to blood loss  Goal:	hemoglobin remain stable  Instructions for follow-up, activity and diet:	Stable  Secondary Diagnosis:	Epistaxis  Goal:	without reoccurrence  Instructions for follow-up, activity and diet:	Notify your doctor if you are experiencing any reoccurrence of nasal bleed  Secondary Diagnosis:	Surgical wound, non healing, initial encounter  Goal:	progressive wound healing, free from infection  Instructions for follow-up, activity and diet:	continue wound vac to upper back wound. Wound Vac dsg with Black Granufoam, at 125mmHg continuous pressure. Wound Vac to be changed 3x weekly on Monday, Wednesday, and Friday. Please follow up with  your surgeon Dr Carias  once discharged from rehab  Secondary Diagnosis:	Essential hypertension  Goal:	SBP<140  Instructions for follow-up, activity and diet:	continue blood pressure medications as ordered  Secondary Diagnosis:	Acquired hypothyroidism  Goal:	remain stable  Instructions for follow-up, activity and diet:	continue synthroid  Secondary Diagnosis:	Coronary artery disease involving native coronary artery of native heart without angina pectoris  Goal:	Remain stable  Instructions for follow-up, activity and diet:	continue Aspirin and Plavix

## 2017-11-05 NOTE — DISCHARGE NOTE ADULT - PROVIDER TOKENS
FREE:[LAST:[Dr. Kemar Martinez-PCP],PHONE:[phone]],FAX:[(   )    -]],FREE:[LAST:[Gabby Mercer (ENT)],PHONE:[phone]:[(   )    -]] FREE:[LAST:[Dr. Kemar Martinez-PCP],PHONE:[phone]],FAX:[(   )    -]],FREE:[LAST:[Gabby Mercer (ENT)],PHONE:[phone]],FAX:[(   )    -]],FREE:[LAST:[Dr Merritt Carrizaleso - Surgery],PHONE:[phone]],FAX:[(   )    -]]

## 2017-11-05 NOTE — DISCHARGE NOTE ADULT - CARE PROVIDER_API CALL
Dr. Kemar Martinez-PCP,   Phone: [phone]  Fax: (   )    -    Gabby Mercer (ENT),   Phone: [phone]  Fax: (   )    - Dr. Kemar Martinez-PCP,   Phone: [phone]  Fax: (   )    -    Gabby Mercer (ENT),   Phone: [phone]  Fax: (   )    -    Dr Merritt Carrizaleso - Surgery,   Phone: [phone]  Fax: (   )    -

## 2017-11-05 NOTE — DISCHARGE NOTE ADULT - MEDICATION SUMMARY - MEDICATIONS TO TAKE
I will START or STAY ON the medications listed below when I get home from the hospital:    aspirin 81 mg oral tablet  -- 1 tab(s) by mouth once a day  -- Indication: For Cad    acetaminophen 325 mg oral tablet  -- 2 tab(s) by mouth every 6 hours, As needed, Moderate and Severe Pain  -- Indication: For Pain     losartan 25 mg oral tablet  -- 1 tab(s) by mouth once a day  -- Indication: For Htn     Flomax 0.4 mg oral capsule  -- 1 cap(s) by mouth once a day (at bedtime)  -- Indication: For bph    gabapentin 100 mg oral tablet  -- 2 tab(s) by mouth every 8 hours  -- Indication: For Pain     metFORMIN 500 mg oral tablet  -- 1 tab(s) by mouth 2 times a day  -- Indication: For DM (diabetes mellitus), type 2    simvastatin 40 mg oral tablet  -- 1 tab(s) by mouth once a day (at bedtime)  -- Indication: For Hld    Plavix 75 mg oral tablet  -- 1 tab(s) by mouth once a day  -- Indication: For Cad     metoprolol succinate 50 mg oral tablet, extended release  -- 1 tab(s) by mouth once a day  -- Indication: For Htn     hydroCHLOROthiazide 12.5 mg oral capsule  -- 1 cap(s) by mouth once a day  -- Indication: For Diuertics    ferrous sulfate 325 mg (65 mg elemental iron) oral delayed release tablet  -- 1 tab(s) by mouth once a day  -- Indication: For Supplemention     bisacodyl 10 mg rectal suppository  -- 1 suppository(ies) rectally once a day, As needed, Constipation  -- Indication: For Constipation     sodium chloride 0.65% nasal spray  -- 1 spray(s) into nose every 4 hours  -- Indication: For nasal spray     lactobacillus acidophilus oral capsule  -- 1 cap(s) by mouth once a day  -- Indication: For Probiotics    levothyroxine 112 mcg (0.112 mg) oral tablet  -- 1 tab(s) by mouth once a day  -- Indication: For Hypothyroidsim     ascorbic acid 500 mg oral tablet  -- 1 tab(s) by mouth once a day  -- Indication: For Supplement

## 2017-11-05 NOTE — DISCHARGE NOTE ADULT - HOSPITAL COURSE
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 He reports since discharge from ED on 10/28 rhino rocket had fallen out and he had recurrence of bleeding.  He lives alone and on Tuesday was able to go to ENT f/u visit (given ride by brother), he reports at that visit packing was reinserted and he was restarted on abx with plan to follow-up on Friday.  He states given recurrence of bleeding he stopped his Plavix. Pt admitted for management of ongoing epistaxis.     Weakness.    - Patient is nonfocal (has deficits in LUE and RLE that are chronic)  - CTH w/ old infarct.    - Weakness may be due to acute anemia -> not meeting criteria for transfusion--no BUSBY, no CP  - orthostatics  - PT consult.- rehab   - Neuro   - ECHO, carotid doppler -> may be done as outpt     Anemia due to epistaxis  - Reports baseline thalassemia trait with Hb 10-12, s/p multiple days of epistaxis and packing, no current bleeding  -pt stopped Plavix due to bleeding  -trend H/H  -monitor for recurrence of nasal bleeding. 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 Wound vac placed Friday  11/3    Essential hypertension.    hold losartan  check orthostatics  c/w metoprolol and htcz.   -11/5-Losartan restarted at half dose (25mg)    Acquired hypothyroidism.   c/w synthroid.    CAD  c/w asa/statin  unclear if SONIA but should likely be on DAPT.   -Plavix on hold  -11/5-Plavix restarted, will monitor for reoccurrence of  epistaxis     DM  - hold orals diabetic diet  c/w statin  c/w gabapentin for neuropathy   -HgbA1c 5.5   -Humalog SS    Spinal stenosis  -PT-Rehab   -gabapentin for pain management    Constipation  -continue colace, senna  -11/4 Dulcolax [place] x 1   -- 67 yo M with h/o HTN, DM2 w/ neuropathy on orals, hypothyroid, mitral regurgitation s/p mitral valve repair, CAD s/p PCI (x2 RCA 6/2017) on DAPT, spinal stenosis s/p c3-c7 laminectomy, carotid stenosis, history of CVA (1993, no deficits), thalassemia trait (baseline Hb 10-12) , recent back mass resection (reports lipoma) with wound vac in place who presented to ED on 10/19 and 10/28 for epistaxis x 2 now with weakness. He reports since discharge from ED on 10/28 rhino rocket had fallen out and he had recurrence of bleeding.  He lives alone and on Tuesday was able to go to ENT f/u visit (given ride by brother), he reports at that visit packing was reinserted and he was restarted on abx with plan to follow-up on Friday.  He states given recurrence of bleeding he stopped his Plavix. Pt admitted for management of ongoing epistaxis.     Weakness.    - Patient is nonfocal (has deficits in LUE and RLE that are chronic)  - CTH w/ old infarct.    - Weakness may be due to acute anemia -> not meeting criteria for transfusion--no BUSBY, no CP  - orthostatics  - PT consult.- rehab   - Neuro   - ECHO, carotid doppler -> may be done as outpt     Anemia due to epistaxis  - Reports baseline thalassemia trait with Hb 10-12, s/p multiple days of epistaxis and packing, no current bleeding  -pt stopped Plavix due to bleeding  -trend H/H  -monitor for recurrence of nasal bleeding. 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 Wound vac placed Friday  11/3    Essential hypertension.    hold losartan  check orthostatics  c/w metoprolol and htcz.   -11/5-Losartan restarted at half dose (25mg)    Acquired hypothyroidism.   c/w synthroid.    CAD  c/w asa/statin  unclear if SONIA but should likely be on DAPT.   -Plavix on hold  -11/5-Plavix restarted, will monitor for reoccurrence of  epistaxis     DM  - hold orals diabetic diet  c/w statin  c/w gabapentin for neuropathy   -HgbA1c 5.5   -Humalog SS    Spinal stenosis  -PT-Rehab   -gabapentin for pain management    Constipation  -continue colace, senna  -11/4 Dulcolax [place] x 1   --      Pt medically stable for discharge to rehab. 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 He reports since discharge from ED on 10/28 rhino rocket had fallen out and he had recurrence of bleeding.  He lives alone and on Tuesday was able to go to ENT f/u visit (given ride by brother), he reports at that visit packing was reinserted and he was restarted on abx with plan to follow-up on Friday.  He states given recurrence of bleeding he stopped his Plavix. Pt admitted for management of ongoing epistaxis.     Weakness.  	  - Patient is nonfocal (has deficits in LUE and RLE that are chronic)  - CTH w/ old infarct.    - Weakness may be due to acute anemia -> not meeting criteria for transfusion--no BUSBY, no CP  - orthostatics  - PT consult.- rehab   - Neuro   - ECHO, carotid doppler -> may be done as outpt     Anemia due to epistaxis  - Reports baseline thalassemia trait with Hb 10-12, s/p multiple days of epistaxis and packing, no current bleeding  -pt stopped Plavix due to bleeding  -trend H/H  -monitor for recurrence of nasal bleeding. 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 Wound vac placed Friday  11/3    Essential hypertension.    hold losartan  check orthostatics  c/w metoprolol and htcz.   -11/5-Losartan restarted at half dose (25mg)    Acquired hypothyroidism.   c/w synthroid.    CAD  c/w asa/statin  unclear if SONIA but should likely be on DAPT.   -Plavix on hold  -11/5-Plavix restarted, will monitor for reoccurrence of  epistaxis     DM  - hold orals diabetic diet  c/w statin  c/w gabapentin for neuropathy   -HgbA1c 5.5   -Humalog SS    Spinal stenosis  -PT-Rehab   -gabapentin for pain management    Constipation  -continue colace, senna  -11/4 Dulcolax [place] x 1   --      Pt medically stable for discharge to rehab.

## 2017-11-06 LAB
BUN SERPL-MCNC: 14 MG/DL — SIGNIFICANT CHANGE UP (ref 7–23)
CALCIUM SERPL-MCNC: 9.1 MG/DL — SIGNIFICANT CHANGE UP (ref 8.4–10.5)
CHLORIDE SERPL-SCNC: 98 MMOL/L — SIGNIFICANT CHANGE UP (ref 98–107)
CO2 SERPL-SCNC: 30 MMOL/L — SIGNIFICANT CHANGE UP (ref 22–31)
CREAT SERPL-MCNC: 0.75 MG/DL — SIGNIFICANT CHANGE UP (ref 0.5–1.3)
GLUCOSE BLDC GLUCOMTR-MCNC: 103 MG/DL — HIGH (ref 70–99)
GLUCOSE BLDC GLUCOMTR-MCNC: 108 MG/DL — HIGH (ref 70–99)
GLUCOSE BLDC GLUCOMTR-MCNC: 108 MG/DL — HIGH (ref 70–99)
GLUCOSE BLDC GLUCOMTR-MCNC: 93 MG/DL — SIGNIFICANT CHANGE UP (ref 70–99)
GLUCOSE SERPL-MCNC: 81 MG/DL — SIGNIFICANT CHANGE UP (ref 70–99)
HCT VFR BLD CALC: 31.7 % — LOW (ref 39–50)
HGB BLD-MCNC: 9.6 G/DL — LOW (ref 13–17)
MCHC RBC-ENTMCNC: 18.6 PG — LOW (ref 27–34)
MCHC RBC-ENTMCNC: 30.3 % — LOW (ref 32–36)
MCV RBC AUTO: 61.3 FL — LOW (ref 80–100)
NRBC # FLD: 0 — SIGNIFICANT CHANGE UP
PLATELET # BLD AUTO: 301 K/UL — SIGNIFICANT CHANGE UP (ref 150–400)
PMV BLD: SIGNIFICANT CHANGE UP FL (ref 7–13)
POTASSIUM SERPL-MCNC: 4.2 MMOL/L — SIGNIFICANT CHANGE UP (ref 3.5–5.3)
POTASSIUM SERPL-SCNC: 4.2 MMOL/L — SIGNIFICANT CHANGE UP (ref 3.5–5.3)
RBC # BLD: 5.17 M/UL — SIGNIFICANT CHANGE UP (ref 4.2–5.8)
RBC # FLD: 17.7 % — HIGH (ref 10.3–14.5)
SODIUM SERPL-SCNC: 140 MMOL/L — SIGNIFICANT CHANGE UP (ref 135–145)
WBC # BLD: 13.7 K/UL — HIGH (ref 3.8–10.5)
WBC # FLD AUTO: 13.7 K/UL — HIGH (ref 3.8–10.5)

## 2017-11-06 PROCEDURE — 73120 X-RAY EXAM OF HAND: CPT | Mod: 26,RT

## 2017-11-06 RX ADMIN — Medication 50 MILLIGRAM(S): at 06:40

## 2017-11-06 RX ADMIN — Medication 1 SPRAY(S): at 19:27

## 2017-11-06 RX ADMIN — Medication 650 MILLIGRAM(S): at 19:36

## 2017-11-06 RX ADMIN — Medication 1 SPRAY(S): at 09:21

## 2017-11-06 RX ADMIN — Medication 81 MILLIGRAM(S): at 13:31

## 2017-11-06 RX ADMIN — Medication 500 MILLIGRAM(S): at 13:30

## 2017-11-06 RX ADMIN — CLOPIDOGREL BISULFATE 75 MILLIGRAM(S): 75 TABLET, FILM COATED ORAL at 13:38

## 2017-11-06 RX ADMIN — GABAPENTIN 200 MILLIGRAM(S): 400 CAPSULE ORAL at 06:41

## 2017-11-06 RX ADMIN — Medication 325 MILLIGRAM(S): at 13:30

## 2017-11-06 RX ADMIN — Medication 500 MILLIGRAM(S): at 06:40

## 2017-11-06 RX ADMIN — Medication 650 MILLIGRAM(S): at 20:15

## 2017-11-06 RX ADMIN — Medication 1 SPRAY(S): at 23:11

## 2017-11-06 RX ADMIN — TAMSULOSIN HYDROCHLORIDE 0.4 MILLIGRAM(S): 0.4 CAPSULE ORAL at 23:10

## 2017-11-06 RX ADMIN — Medication 500 MILLIGRAM(S): at 13:31

## 2017-11-06 RX ADMIN — LOSARTAN POTASSIUM 25 MILLIGRAM(S): 100 TABLET, FILM COATED ORAL at 06:40

## 2017-11-06 RX ADMIN — Medication 112 MICROGRAM(S): at 06:40

## 2017-11-06 RX ADMIN — Medication 1 SPRAY(S): at 02:01

## 2017-11-06 RX ADMIN — Medication 1 SPRAY(S): at 13:39

## 2017-11-06 RX ADMIN — Medication 500 MILLIGRAM(S): at 23:10

## 2017-11-06 RX ADMIN — Medication 12.5 MILLIGRAM(S): at 06:40

## 2017-11-06 RX ADMIN — Medication 1 SPRAY(S): at 06:40

## 2017-11-06 RX ADMIN — SIMVASTATIN 40 MILLIGRAM(S): 20 TABLET, FILM COATED ORAL at 23:11

## 2017-11-06 RX ADMIN — Medication 1 TABLET(S): at 06:41

## 2017-11-06 RX ADMIN — GABAPENTIN 200 MILLIGRAM(S): 400 CAPSULE ORAL at 23:11

## 2017-11-06 RX ADMIN — Medication 1 TABLET(S): at 17:01

## 2017-11-06 RX ADMIN — GABAPENTIN 200 MILLIGRAM(S): 400 CAPSULE ORAL at 13:38

## 2017-11-06 NOTE — PROGRESS NOTE ADULT - SUBJECTIVE AND OBJECTIVE BOX
Patient is a 66y old  Male who presents with a chief complaint of couldn't walk or stand, weakness. (02 Nov 2017 23:58)  pt seen and examined at bedside on 11/6/17  SUBJECTIVE/OVERNIGHT events no furthur epistaxis, feels well  Vital Signs Last 24 Hrs  stable       MEDICATIONS  (STANDING):  ascorbic acid 500 milliGRAM(s) Oral daily  aspirin enteric coated 81 milliGRAM(s) Oral daily  cephalexin 500 milliGRAM(s) Oral every 8 hours  clopidogrel Tablet 75 milliGRAM(s) Oral daily  dextrose 50% Injectable 12.5 Gram(s) IV Push once  dextrose 50% Injectable 25 Gram(s) IV Push once  dextrose 50% Injectable 25 Gram(s) IV Push once  ferrous    sulfate 325 milliGRAM(s) Oral with lunch  gabapentin 200 milliGRAM(s) Oral every 8 hours  hydrochlorothiazide 12.5 milliGRAM(s) Oral daily  insulin lispro (HumaLOG) corrective regimen sliding scale   SubCutaneous three times a day before meals  insulin lispro (HumaLOG) corrective regimen sliding scale   SubCutaneous at bedtime  lactobacillus acidophilus 1 Tablet(s) Oral two times a day with meals  levothyroxine 112 MICROGram(s) Oral daily  losartan 25 milliGRAM(s) Oral daily  metoprolol succinate ER 50 milliGRAM(s) Oral daily  simvastatin 40 milliGRAM(s) Oral at bedtime  sodium chloride 0.65% Nasal 1 Spray(s) Both Nostrils every 4 hours  tamsulosin 0.4 milliGRAM(s) Oral at bedtime    MEDICATIONS  (PRN):  acetaminophen   Tablet. 650 milliGRAM(s) Oral every 6 hours PRN Moderate and Severe Pain  bisacodyl Suppository 10 milliGRAM(s) Rectal daily PRN Constipation  dextrose Gel 1 Dose(s) Oral once PRN Blood Glucose LESS THAN 70 milliGRAM(s)/deciliter  glucagon  Injectable 1 milliGRAM(s) IntraMuscular once PRN Glucose LESS THAN 70 milligrams/deciliter  oxymetazoline 0.05% Nasal Spray 1 Spray(s) Left Nostril two times a day PRN nasal bleeding    PHYSICAL EXAM  General : comfortable, not in any acute distress  Neck : supple, no LAD, no JVD  Eyes : EOMI, PERRLA, conjunctiva : no icterus, no pallor  MM moist, no pharyngeal erythema/exudates  Pulmonary: clear to auscultation bilateral lung fields, no crackles/rhonchi/wheezing,   CVS : S1 S2,rate normal-,rhythm-regular, no audible murmurs, no abnormal sounds  Gastrointestinal: soft, non tender, non distended, no organomegaly , bowel sounds audible  Extremities: no edema  Neuro: AAOx3, no focal deficits  Musculoskeletal: , FROM of all ext, LE teds  Skin: no rash  LABS             reveiwed stable, h/h stable      RADIOLOGY and IMAGING reviewed: yes  Consultant notes reviewed : yes  Care discussed with Consultants/other providers :

## 2017-11-06 NOTE — CHART NOTE - NSCHARTNOTEFT_GEN_A_CORE
Pt with c/o Right hand 3rd digit pain, swelling, decreased mobility, denies any trauma. On exam, no significant swelling noted, +decreased ROM at PIP joint. Right hand Xray- unremarkable. Will continue pain management, encourage AROM, continue to monitor. Dr. Hoskins aware

## 2017-11-07 LAB
BUN SERPL-MCNC: 12 MG/DL — SIGNIFICANT CHANGE UP (ref 7–23)
CALCIUM SERPL-MCNC: 9 MG/DL — SIGNIFICANT CHANGE UP (ref 8.4–10.5)
CHLORIDE SERPL-SCNC: 99 MMOL/L — SIGNIFICANT CHANGE UP (ref 98–107)
CO2 SERPL-SCNC: 28 MMOL/L — SIGNIFICANT CHANGE UP (ref 22–31)
CREAT SERPL-MCNC: 0.59 MG/DL — SIGNIFICANT CHANGE UP (ref 0.5–1.3)
GLUCOSE BLDC GLUCOMTR-MCNC: 117 MG/DL — HIGH (ref 70–99)
GLUCOSE BLDC GLUCOMTR-MCNC: 117 MG/DL — HIGH (ref 70–99)
GLUCOSE BLDC GLUCOMTR-MCNC: 122 MG/DL — HIGH (ref 70–99)
GLUCOSE BLDC GLUCOMTR-MCNC: 93 MG/DL — SIGNIFICANT CHANGE UP (ref 70–99)
GLUCOSE SERPL-MCNC: 84 MG/DL — SIGNIFICANT CHANGE UP (ref 70–99)
POTASSIUM SERPL-MCNC: 4.3 MMOL/L — SIGNIFICANT CHANGE UP (ref 3.5–5.3)
POTASSIUM SERPL-SCNC: 4.3 MMOL/L — SIGNIFICANT CHANGE UP (ref 3.5–5.3)
SODIUM SERPL-SCNC: 137 MMOL/L — SIGNIFICANT CHANGE UP (ref 135–145)

## 2017-11-07 RX ADMIN — Medication 1 SPRAY(S): at 10:21

## 2017-11-07 RX ADMIN — LOSARTAN POTASSIUM 25 MILLIGRAM(S): 100 TABLET, FILM COATED ORAL at 10:20

## 2017-11-07 RX ADMIN — Medication 500 MILLIGRAM(S): at 14:01

## 2017-11-07 RX ADMIN — Medication 325 MILLIGRAM(S): at 14:01

## 2017-11-07 RX ADMIN — Medication 12.5 MILLIGRAM(S): at 10:20

## 2017-11-07 RX ADMIN — Medication 81 MILLIGRAM(S): at 14:01

## 2017-11-07 RX ADMIN — TAMSULOSIN HYDROCHLORIDE 0.4 MILLIGRAM(S): 0.4 CAPSULE ORAL at 22:57

## 2017-11-07 RX ADMIN — Medication 650 MILLIGRAM(S): at 11:00

## 2017-11-07 RX ADMIN — Medication 1 TABLET(S): at 10:22

## 2017-11-07 RX ADMIN — Medication 1 SPRAY(S): at 14:01

## 2017-11-07 RX ADMIN — Medication 1 SPRAY(S): at 22:57

## 2017-11-07 RX ADMIN — GABAPENTIN 200 MILLIGRAM(S): 400 CAPSULE ORAL at 14:01

## 2017-11-07 RX ADMIN — Medication 1 SPRAY(S): at 18:07

## 2017-11-07 RX ADMIN — Medication 112 MICROGRAM(S): at 06:11

## 2017-11-07 RX ADMIN — Medication 1 SPRAY(S): at 06:11

## 2017-11-07 RX ADMIN — Medication 50 MILLIGRAM(S): at 10:20

## 2017-11-07 RX ADMIN — Medication 1 TABLET(S): at 18:07

## 2017-11-07 RX ADMIN — Medication 500 MILLIGRAM(S): at 22:57

## 2017-11-07 RX ADMIN — SIMVASTATIN 40 MILLIGRAM(S): 20 TABLET, FILM COATED ORAL at 22:57

## 2017-11-07 RX ADMIN — GABAPENTIN 200 MILLIGRAM(S): 400 CAPSULE ORAL at 06:11

## 2017-11-07 RX ADMIN — Medication 500 MILLIGRAM(S): at 06:11

## 2017-11-07 RX ADMIN — CLOPIDOGREL BISULFATE 75 MILLIGRAM(S): 75 TABLET, FILM COATED ORAL at 14:01

## 2017-11-07 RX ADMIN — GABAPENTIN 200 MILLIGRAM(S): 400 CAPSULE ORAL at 22:57

## 2017-11-07 RX ADMIN — Medication 650 MILLIGRAM(S): at 10:20

## 2017-11-07 NOTE — CHART NOTE - NSCHARTNOTEFT_GEN_A_CORE
Bedboard called to transfer patient to stroke unit. D/ W Dr. Hoskins no need for transfer to a new floor. Patient is medically ready for discharge. Documents reviewed. Patient was seen by Neurology on admission. re neurology Chronic stroke on CT head. No deficits on exam  Recommendation: aspirin 81,carotid doppler, echo. This workup can be done as outpatient.

## 2017-11-07 NOTE — PROGRESS NOTE ADULT - SUBJECTIVE AND OBJECTIVE BOX
Patient is a 66y old  Male who presents with a chief complaint of couldn't walk or stand, weakness. (05 Nov 2017 16:21)  pt seen and examined at bedside   SUBJECTIVE/OVERNIGHT events :states le pain improved, refused le doppler, rt finger weakness persistant  Vital Signs Last 24 Hrs  T(C): 36.6 (07 Nov 2017 06:07), Max: 36.9 (06 Nov 2017 21:16)  T(F): 97.8 (07 Nov 2017 06:07), Max: 98.5 (06 Nov 2017 21:16)  HR: 87 (07 Nov 2017 10:09) (73 - 87)  BP: 122/67 (07 Nov 2017 10:09) (110/66 - 144/77)  BP(mean): --  RR: 20 (07 Nov 2017 06:07) (18 - 20)  SpO2: 98% (07 Nov 2017 06:07) (98% - 99%)  CAPILLARY BLOOD GLUCOSE      POCT Blood Glucose.: 117 mg/dL (07 Nov 2017 12:52)  POCT Blood Glucose.: 93 mg/dL (07 Nov 2017 08:32)  POCT Blood Glucose.: 93 mg/dL (06 Nov 2017 22:33)  POCT Blood Glucose.: 103 mg/dL (06 Nov 2017 17:52)    MEDICATIONS  (STANDING):  ascorbic acid 500 milliGRAM(s) Oral daily  aspirin enteric coated 81 milliGRAM(s) Oral daily  cephalexin 500 milliGRAM(s) Oral every 8 hours  clopidogrel Tablet 75 milliGRAM(s) Oral daily  dextrose 50% Injectable 12.5 Gram(s) IV Push once  dextrose 50% Injectable 25 Gram(s) IV Push once  dextrose 50% Injectable 25 Gram(s) IV Push once  ferrous    sulfate 325 milliGRAM(s) Oral with lunch  gabapentin 200 milliGRAM(s) Oral every 8 hours  hydrochlorothiazide 12.5 milliGRAM(s) Oral daily  insulin lispro (HumaLOG) corrective regimen sliding scale   SubCutaneous three times a day before meals  insulin lispro (HumaLOG) corrective regimen sliding scale   SubCutaneous at bedtime  lactobacillus acidophilus 1 Tablet(s) Oral two times a day with meals  levothyroxine 112 MICROGram(s) Oral daily  losartan 25 milliGRAM(s) Oral daily  metoprolol succinate ER 50 milliGRAM(s) Oral daily  simvastatin 40 milliGRAM(s) Oral at bedtime  sodium chloride 0.65% Nasal 1 Spray(s) Both Nostrils every 4 hours  tamsulosin 0.4 milliGRAM(s) Oral at bedtime    MEDICATIONS  (PRN):  acetaminophen   Tablet. 650 milliGRAM(s) Oral every 6 hours PRN Moderate and Severe Pain  bisacodyl Suppository 10 milliGRAM(s) Rectal daily PRN Constipation  dextrose Gel 1 Dose(s) Oral once PRN Blood Glucose LESS THAN 70 milliGRAM(s)/deciliter  glucagon  Injectable 1 milliGRAM(s) IntraMuscular once PRN Glucose LESS THAN 70 milligrams/deciliter  oxymetazoline 0.05% Nasal Spray 1 Spray(s) Left Nostril two times a day PRN nasal bleeding    PHYSICAL EXAM  General : comfortable, not in any acute distress  Neck : supple, no LAD, no JVD  Eyes : EOMI, PERRLA, conjunctiva : no icterus, no pallor  MM moist, no pharyngeal erythema/exudates  Pulmonary: clear to auscultation bilateral lung fields, no crackles/rhonchi/wheezing,   CVS : S1 S2,rate normal-,rhythm-regular, no murmurs, no abnormal sounds  Gastrointestinal: soft, non tender, non distended, no organomegaly , bowel sounds audible  Extremities: no edema  Neuro: AAOx3, no focal deficits  Musculoskeletal:  FROM of all ext  Skin: no rash  LABS                        9.6    13.70 )-----------( 301      ( 06 Nov 2017 05:40 )             31.7     11-07    137  |  99  |  12  ----------------------------<  84  4.3   |  28  |  0.59    Ca    9.0      07 Nov 2017 06:08        RADIOLOGY and IMAGING reviewed: yes  Consultant notes reviewed : yes  Care discussed with Consultants/other providers :

## 2017-11-08 VITALS
SYSTOLIC BLOOD PRESSURE: 138 MMHG | OXYGEN SATURATION: 99 % | RESPIRATION RATE: 18 BRPM | HEART RATE: 75 BPM | DIASTOLIC BLOOD PRESSURE: 79 MMHG | TEMPERATURE: 99 F

## 2017-11-08 LAB
BUN SERPL-MCNC: 14 MG/DL — SIGNIFICANT CHANGE UP (ref 7–23)
CALCIUM SERPL-MCNC: 9.1 MG/DL — SIGNIFICANT CHANGE UP (ref 8.4–10.5)
CHLORIDE SERPL-SCNC: 96 MMOL/L — LOW (ref 98–107)
CO2 SERPL-SCNC: 32 MMOL/L — HIGH (ref 22–31)
CREAT SERPL-MCNC: 0.55 MG/DL — SIGNIFICANT CHANGE UP (ref 0.5–1.3)
GLUCOSE BLDC GLUCOMTR-MCNC: 114 MG/DL — HIGH (ref 70–99)
GLUCOSE BLDC GLUCOMTR-MCNC: 122 MG/DL — HIGH (ref 70–99)
GLUCOSE BLDC GLUCOMTR-MCNC: 88 MG/DL — SIGNIFICANT CHANGE UP (ref 70–99)
GLUCOSE SERPL-MCNC: 98 MG/DL — SIGNIFICANT CHANGE UP (ref 70–99)
HCT VFR BLD CALC: 33.7 % — LOW (ref 39–50)
HGB BLD-MCNC: 10 G/DL — LOW (ref 13–17)
MCHC RBC-ENTMCNC: 18.5 PG — LOW (ref 27–34)
MCHC RBC-ENTMCNC: 29.7 % — LOW (ref 32–36)
MCV RBC AUTO: 62.2 FL — LOW (ref 80–100)
NRBC # FLD: 0 — SIGNIFICANT CHANGE UP
PLATELET # BLD AUTO: 343 K/UL — SIGNIFICANT CHANGE UP (ref 150–400)
PMV BLD: 10.4 FL — SIGNIFICANT CHANGE UP (ref 7–13)
POTASSIUM SERPL-MCNC: 3.9 MMOL/L — SIGNIFICANT CHANGE UP (ref 3.5–5.3)
POTASSIUM SERPL-SCNC: 3.9 MMOL/L — SIGNIFICANT CHANGE UP (ref 3.5–5.3)
RBC # BLD: 5.42 M/UL — SIGNIFICANT CHANGE UP (ref 4.2–5.8)
RBC # FLD: 17.5 % — HIGH (ref 10.3–14.5)
SODIUM SERPL-SCNC: 137 MMOL/L — SIGNIFICANT CHANGE UP (ref 135–145)
WBC # BLD: 10.81 K/UL — HIGH (ref 3.8–10.5)
WBC # FLD AUTO: 10.81 K/UL — HIGH (ref 3.8–10.5)

## 2017-11-08 RX ORDER — LOSARTAN POTASSIUM 100 MG/1
1 TABLET, FILM COATED ORAL
Qty: 0 | Refills: 0 | COMMUNITY
Start: 2017-11-08

## 2017-11-08 RX ORDER — ACETAMINOPHEN 500 MG
2 TABLET ORAL
Qty: 0 | Refills: 0 | COMMUNITY
Start: 2017-11-08

## 2017-11-08 RX ORDER — SODIUM CHLORIDE 0.65 %
1 AEROSOL, SPRAY (ML) NASAL
Qty: 0 | Refills: 0 | COMMUNITY
Start: 2017-11-08

## 2017-11-08 RX ORDER — ASCORBIC ACID 60 MG
1 TABLET,CHEWABLE ORAL
Qty: 0 | Refills: 0 | COMMUNITY
Start: 2017-11-08

## 2017-11-08 RX ORDER — LOSARTAN POTASSIUM 100 MG/1
1 TABLET, FILM COATED ORAL
Qty: 0 | Refills: 0 | COMMUNITY

## 2017-11-08 RX ORDER — LACTOBACILLUS ACIDOPHILUS 100MM CELL
1 CAPSULE ORAL
Qty: 0 | Refills: 0 | COMMUNITY
Start: 2017-11-08

## 2017-11-08 RX ORDER — CEPHALEXIN 500 MG
1 CAPSULE ORAL
Qty: 0 | Refills: 0 | COMMUNITY

## 2017-11-08 RX ADMIN — Medication 1 SPRAY(S): at 14:03

## 2017-11-08 RX ADMIN — GABAPENTIN 200 MILLIGRAM(S): 400 CAPSULE ORAL at 14:03

## 2017-11-08 RX ADMIN — Medication 1 TABLET(S): at 06:36

## 2017-11-08 RX ADMIN — GABAPENTIN 200 MILLIGRAM(S): 400 CAPSULE ORAL at 06:36

## 2017-11-08 RX ADMIN — Medication 81 MILLIGRAM(S): at 12:17

## 2017-11-08 RX ADMIN — Medication 1 SPRAY(S): at 06:36

## 2017-11-08 RX ADMIN — Medication 650 MILLIGRAM(S): at 09:59

## 2017-11-08 RX ADMIN — Medication 12.5 MILLIGRAM(S): at 06:36

## 2017-11-08 RX ADMIN — Medication 325 MILLIGRAM(S): at 12:17

## 2017-11-08 RX ADMIN — Medication 650 MILLIGRAM(S): at 10:14

## 2017-11-08 RX ADMIN — Medication 500 MILLIGRAM(S): at 12:17

## 2017-11-08 RX ADMIN — Medication 50 MILLIGRAM(S): at 06:36

## 2017-11-08 RX ADMIN — CLOPIDOGREL BISULFATE 75 MILLIGRAM(S): 75 TABLET, FILM COATED ORAL at 12:17

## 2017-11-08 RX ADMIN — Medication 1 SPRAY(S): at 09:59

## 2017-11-08 RX ADMIN — Medication 112 MICROGRAM(S): at 06:36

## 2017-11-08 RX ADMIN — Medication 500 MILLIGRAM(S): at 06:36

## 2017-11-08 RX ADMIN — LOSARTAN POTASSIUM 25 MILLIGRAM(S): 100 TABLET, FILM COATED ORAL at 06:36

## 2017-11-08 RX ADMIN — Medication 1 SPRAY(S): at 02:23

## 2017-11-08 NOTE — PROGRESS NOTE ADULT - ASSESSMENT
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
66M hx left epistaxis s/p merocel placement by outside ENT, now  s/p removal of mercocel. No active bleeding from left nare. Left nasal mucosa appears friable, afrin soaked surgicel placed in left nare as prophylactic measure.   -	no further ORL intervention  -	patient has absorbable packing in nose – no need for removal, also okay if packing falls out  -	no need for antibiotics from ENT perspective  -	to prevent further episodes of epistaxis – avoid digital manipulation of the nose, avoid forceful nose blowing, sneeze with mouth open, avoid stuffing tissues in the nose, apply Vaseline to bilateral nares BID, apply nasal saline spray every 4 hours while awake to bilateral nares  -	if patient develops rebleeding from left nare – apply afrin nasal spray BID x 3 days PRN for bleeding, apply constant nasal pressure for 15 minutes  -	patient should follow up with his ENT outpatient within 2 weeks of discharge  -	call with questions
67 yo M with h/o HTN, DM2 w/ neuropathy on orals, hypothyroid, mitral regurgitation s/p mitral valve repair, CAD s/p PCI (x2 RCA 6/2017) on DAPT, spinal stenosis s/p c3-c7 laminectomy, carotid stenosis, history of CVA (1993, no deficits), thalassemia trait (baseline Hb 10-12) , recent back mass resection (reports lipoma) with wound vac in place who presented to ED on 10/19 and 10/28 for epistaxis x 2 now with weakness, HD stable, labs notable for drop in Hb from 10-->8.

## 2017-11-08 NOTE — PROGRESS NOTE ADULT - PROBLEM SELECTOR PLAN 4
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

## 2017-11-08 NOTE — PROGRESS NOTE ADULT - SUBJECTIVE AND OBJECTIVE BOX
Patient is a 66y old  Male who presents with a chief complaint of couldn't walk or stand, weakness. (05 Nov 2017 16:21)      SUBJECTIVE / OVERNIGHT EVENTS:  Pt seen and examined at bedside. Feels well.   No chest pain, no shortness of breath.   No overnight event.   No N/V/D. No abdominal pain.   No further epistaxis.     Vital Signs Last 24 Hrs  T(C): 36.7 (08 Nov 2017 07:58), Max: 36.8 (07 Nov 2017 21:50)  T(F): 98.1 (08 Nov 2017 07:58), Max: 98.2 (07 Nov 2017 21:50)  HR: 88 (08 Nov 2017 07:58) (77 - 88)  BP: 145/78 (08 Nov 2017 07:58) (122/67 - 155/74)  BP(mean): --  RR: 18 (08 Nov 2017 07:58) (18 - 18)  SpO2: 98% (08 Nov 2017 07:58) (97% - 98%)  I&O's Summary      PHYSICAL EXAM:  GENERAL: NAD, Comfortable  HEAD: Atraumatic, Normocephalic  EYES: EOMI, PERRLA, conjunctiva and sclera clear  NECK: Supple, No JVD  CHEST/LUNG: Clear to auscultation bilaterally; No wheeze  HEART: Regular rate and rhythm; No murmurs, rubs, or gallops  ABDOMEN: Soft, Nontender, Nondistended; Bowel sounds present  Neuro: AAO x 3, no focal deficit, 5/5 b/l extremities  EXTREMITIES:  2+ Peripheral Pulses, No clubbing, cyanosis, or edema  SKIN: No rashes or lesions    LABS:    11-07    137  |  99  |  12  ----------------------------<  84  4.3   |  28  |  0.59    Ca    9.0      07 Nov 2017 06:08        CAPILLARY BLOOD GLUCOSE      POCT Blood Glucose.: 114 mg/dL (08 Nov 2017 08:38)  POCT Blood Glucose.: 117 mg/dL (07 Nov 2017 22:53)  POCT Blood Glucose.: 122 mg/dL (07 Nov 2017 17:57)  POCT Blood Glucose.: 117 mg/dL (07 Nov 2017 12:52)            RADIOLOGY & ADDITIONAL TESTS:    Imaging Personally Reviewed:  [x] YES  [ ] NO    Consultant(s) Notes Reviewed:  [x] YES  [ ] NO      MEDICATIONS  (STANDING):  ascorbic acid 500 milliGRAM(s) Oral daily  aspirin enteric coated 81 milliGRAM(s) Oral daily  cephalexin 500 milliGRAM(s) Oral every 8 hours  clopidogrel Tablet 75 milliGRAM(s) Oral daily  dextrose 50% Injectable 12.5 Gram(s) IV Push once  dextrose 50% Injectable 25 Gram(s) IV Push once  dextrose 50% Injectable 25 Gram(s) IV Push once  ferrous    sulfate 325 milliGRAM(s) Oral with lunch  gabapentin 200 milliGRAM(s) Oral every 8 hours  hydrochlorothiazide 12.5 milliGRAM(s) Oral daily  insulin lispro (HumaLOG) corrective regimen sliding scale   SubCutaneous three times a day before meals  insulin lispro (HumaLOG) corrective regimen sliding scale   SubCutaneous at bedtime  lactobacillus acidophilus 1 Tablet(s) Oral two times a day with meals  levothyroxine 112 MICROGram(s) Oral daily  losartan 25 milliGRAM(s) Oral daily  metoprolol succinate ER 50 milliGRAM(s) Oral daily  simvastatin 40 milliGRAM(s) Oral at bedtime  sodium chloride 0.65% Nasal 1 Spray(s) Both Nostrils every 4 hours  tamsulosin 0.4 milliGRAM(s) Oral at bedtime    MEDICATIONS  (PRN):  acetaminophen   Tablet. 650 milliGRAM(s) Oral every 6 hours PRN Moderate and Severe Pain  bisacodyl Suppository 10 milliGRAM(s) Rectal daily PRN Constipation  dextrose Gel 1 Dose(s) Oral once PRN Blood Glucose LESS THAN 70 milliGRAM(s)/deciliter  glucagon  Injectable 1 milliGRAM(s) IntraMuscular once PRN Glucose LESS THAN 70 milligrams/deciliter  oxymetazoline 0.05% Nasal Spray 1 Spray(s) Left Nostril two times a day PRN nasal bleeding      Care Discussed with Consultants/Other Providers [x] YES  [ ] NO    HEALTH ISSUES - PROBLEM Dx:  CVA (cerebral vascular accident): CVA (cerebral vascular accident)  Prophylactic measure: Prophylactic measure  Spinal stenosis, unspecified spinal region: Spinal stenosis, unspecified spinal region  Type 2 diabetes mellitus with diabetic polyneuropathy, without long-term current use of insulin: Type 2 diabetes mellitus with diabetic polyneuropathy, without long-term current use of insulin  Coronary artery disease involving native coronary artery of native heart without angina pectoris: Coronary artery disease involving native coronary artery of native heart without angina pectoris  Acquired hypothyroidism: Acquired hypothyroidism  Essential hypertension: Essential hypertension  Surgical wound, non healing, initial encounter: Surgical wound, non healing, initial encounter  Epistaxis: Epistaxis  Anemia due to blood loss: Anemia due to blood loss  Weakness: Weakness Patient is a 66y old  Male who presents with a chief complaint of couldn't walk or stand, weakness. (05 Nov 2017 16:21)      SUBJECTIVE / OVERNIGHT EVENTS:  Pt seen and examined at bedside. Feels well.   No chest pain, no shortness of breath.   No overnight event.   No N/V/D. No abdominal pain.   No further epistaxis.     Vital Signs Last 24 Hrs  T(C): 36.7 (08 Nov 2017 07:58), Max: 36.8 (07 Nov 2017 21:50)  T(F): 98.1 (08 Nov 2017 07:58), Max: 98.2 (07 Nov 2017 21:50)  HR: 88 (08 Nov 2017 07:58) (77 - 88)  BP: 145/78 (08 Nov 2017 07:58) (122/67 - 155/74)  BP(mean): --  RR: 18 (08 Nov 2017 07:58) (18 - 18)  SpO2: 98% (08 Nov 2017 07:58) (97% - 98%)  I&O's Summary      PHYSICAL EXAM:  GENERAL: NAD, Comfortable  HEAD: Atraumatic, Normocephalic  EYES: EOMI, PERRLA, conjunctiva and sclera clear  NECK: Supple, No JVD  CHEST/LUNG: Clear to auscultation bilaterally; No wheeze  HEART: Regular rate and rhythm; No murmurs, rubs, or gallops  ABDOMEN: Soft, Nontender, Nondistended; Bowel sounds present  Back: spine wound vac in place.   Neuro: AAO x 3, chronic baseline left UE 4/5, right LE 4/5.   EXTREMITIES:  2+ Peripheral Pulses, No clubbing, cyanosis, or edema  SKIN: No rashes or lesions    LABS:    11-07    137  |  99  |  12  ----------------------------<  84  4.3   |  28  |  0.59    Ca    9.0      07 Nov 2017 06:08        CAPILLARY BLOOD GLUCOSE      POCT Blood Glucose.: 114 mg/dL (08 Nov 2017 08:38)  POCT Blood Glucose.: 117 mg/dL (07 Nov 2017 22:53)  POCT Blood Glucose.: 122 mg/dL (07 Nov 2017 17:57)  POCT Blood Glucose.: 117 mg/dL (07 Nov 2017 12:52)            RADIOLOGY & ADDITIONAL TESTS:    Imaging Personally Reviewed:  [x] YES  [ ] NO    Consultant(s) Notes Reviewed:  [x] YES  [ ] NO      MEDICATIONS  (STANDING):  ascorbic acid 500 milliGRAM(s) Oral daily  aspirin enteric coated 81 milliGRAM(s) Oral daily  cephalexin 500 milliGRAM(s) Oral every 8 hours  clopidogrel Tablet 75 milliGRAM(s) Oral daily  dextrose 50% Injectable 12.5 Gram(s) IV Push once  dextrose 50% Injectable 25 Gram(s) IV Push once  dextrose 50% Injectable 25 Gram(s) IV Push once  ferrous    sulfate 325 milliGRAM(s) Oral with lunch  gabapentin 200 milliGRAM(s) Oral every 8 hours  hydrochlorothiazide 12.5 milliGRAM(s) Oral daily  insulin lispro (HumaLOG) corrective regimen sliding scale   SubCutaneous three times a day before meals  insulin lispro (HumaLOG) corrective regimen sliding scale   SubCutaneous at bedtime  lactobacillus acidophilus 1 Tablet(s) Oral two times a day with meals  levothyroxine 112 MICROGram(s) Oral daily  losartan 25 milliGRAM(s) Oral daily  metoprolol succinate ER 50 milliGRAM(s) Oral daily  simvastatin 40 milliGRAM(s) Oral at bedtime  sodium chloride 0.65% Nasal 1 Spray(s) Both Nostrils every 4 hours  tamsulosin 0.4 milliGRAM(s) Oral at bedtime    MEDICATIONS  (PRN):  acetaminophen   Tablet. 650 milliGRAM(s) Oral every 6 hours PRN Moderate and Severe Pain  bisacodyl Suppository 10 milliGRAM(s) Rectal daily PRN Constipation  dextrose Gel 1 Dose(s) Oral once PRN Blood Glucose LESS THAN 70 milliGRAM(s)/deciliter  glucagon  Injectable 1 milliGRAM(s) IntraMuscular once PRN Glucose LESS THAN 70 milligrams/deciliter  oxymetazoline 0.05% Nasal Spray 1 Spray(s) Left Nostril two times a day PRN nasal bleeding      Care Discussed with Consultants/Other Providers [x] YES  [ ] NO    HEALTH ISSUES - PROBLEM Dx:  CVA (cerebral vascular accident): CVA (cerebral vascular accident)  Prophylactic measure: Prophylactic measure  Spinal stenosis, unspecified spinal region: Spinal stenosis, unspecified spinal region  Type 2 diabetes mellitus with diabetic polyneuropathy, without long-term current use of insulin: Type 2 diabetes mellitus with diabetic polyneuropathy, without long-term current use of insulin  Coronary artery disease involving native coronary artery of native heart without angina pectoris: Coronary artery disease involving native coronary artery of native heart without angina pectoris  Acquired hypothyroidism: Acquired hypothyroidism  Essential hypertension: Essential hypertension  Surgical wound, non healing, initial encounter: Surgical wound, non healing, initial encounter  Epistaxis: Epistaxis  Anemia due to blood loss: Anemia due to blood loss  Weakness: Weakness

## 2017-11-08 NOTE — PROGRESS NOTE ADULT - PROBLEM SELECTOR PLAN 1
Patient is nonfocal (has deficits in LUE and RLE that are chronic)--CTH w/ old infarct.  Weakness may be due to acute anemia d/t epistaxis or deconditioning d/t  recent back wound sx, spinal stenosis  -PT consult
Patient is nonfocal (has deficits in LUE and RLE that are chronic)--CTH w/ old infarct.  Also no CP, palpitations, LOC--EKG w/o changes, trop negative.  CK wnl.    Denies infectious symptoms.   Weakness may be due to acute anemia however not meeting criteria for transfusion--no BUSBY, no CP, just generalized weakness  -PT consult
Patient is nonfocal (has deficits in LUE and RLE that are chronic)--CTH w/ old infarct.  Also no CP, palpitations, LOC--EKG w/o changes, trop negative.  CK wnl.    Denies infectious symptoms.   Weakness may be due to acute anemia however not meeting criteria for transfusion--no BUSBY, no CP, just generalized weakness  -check orthostatics  -PT consult
Patient is nonfocal (has deficits in LUE and RLE that are chronic)--CTH w/ old infarct.  Weakness may be due to acute anemia d/t epistaxis or deconditioning d/t  recent back wound sx, spinal stenosis  -PT consult- rec rehab, pt medically stable for dc, d/w SW
Patient is nonfocal (has deficits in LUE and RLE that are chronic)--CTH w/ old infarct. Weakness may be due to acute anemia d/t epistaxis or deconditioning d/t recent back wound sx, spinal stenosis  -PT consult recommends rehab, pt medically stable for d/c planning.
Patient is nonfocal (has deficits in LUE and RLE that are chronic)--CTH w/ old infarct.  Weakness may be due to acute anemia d/t epistaxis or deconditioning d/t  recent back wound sx, spinal stenosis  -PT consult

## 2017-11-08 NOTE — PROGRESS NOTE ADULT - ATTENDING COMMENTS
Rimma Hoskins MD ( [facility])  [phone]
Rimma Hoskins MD ( [facility])  [phone]
Rimma Hoskins MD ( [facility])  [phone]
Rimma Hoskins MD ( [facility])  [phone]
Rimma Hoskins MD- [facility]  [phone]    Dr Hewitt will be covering me starting 11/8/17  Please page 
Rimma Hoskins MD- [facility]  [phone]    Dr Hewitt will be covering me starting 11/8/17  Please page

## 2017-11-08 NOTE — PROGRESS NOTE ADULT - PROBLEM SELECTOR PROBLEM 8
Type 2 diabetes mellitus with diabetic polyneuropathy, without long-term current use of insulin

## 2017-11-08 NOTE — PROGRESS NOTE ADULT - PROBLEM SELECTOR PLAN 5
hold losartan  check orthostatics  c/w metoprolol and htcz

## 2017-11-08 NOTE — PROGRESS NOTE ADULT - PROBLEM SELECTOR PROBLEM 6
Acquired hypothyroidism

## 2017-11-08 NOTE — PROGRESS NOTE ADULT - PROBLEM SELECTOR PLAN 10
ambulate  recent bleeding, improve score 1, no pharm ppx
PETRA/phil  Lt leg pain-lE doppler
ambulate  recent bleeding, improve score 1, no pharm ppx
ambulate  recent bleeding, improve score 1, no pharm ppx
teds/venodynes  lt leg pain improved, low suspicion for vte, monitor  pt adamantly refusing LE doppler
teds/venodynes  lt leg pain improved, low suspicion for vte, monitor  pt adamantly refusing LE doppler

## 2017-11-08 NOTE — PROGRESS NOTE ADULT - PROBLEM SELECTOR PROBLEM 4
Surgical wound, non healing, initial encounter

## 2017-11-08 NOTE — PROGRESS NOTE ADULT - PROBLEM SELECTOR PLAN 8
hold orals diabetic diet  c/w statin  c/w gabapentin for neuropathy   NICKI

## 2017-11-08 NOTE — PROGRESS NOTE ADULT - PROBLEM SELECTOR PLAN 2
Reports baseline thalassemia trait with Hb 10-12, s/p multiple days of epistaxis and packing, no current bleeding, pt stopped plavix due to bleeding  -trend H/H  -monitor for recurrence of nasal bleeding
Reports baseline thalassemia trait with Hb 10-12, s/p multiple days of epistaxis and packing, no current bleeding, pt stopped plavix due to bleeding  -trend H/H  -if orthostatic can consider tx  -monitor for recurrence of nasal bleeding
Reports baseline thalassemia trait with Hb 10-12, s/p multiple days of epistaxis and packing, no current bleeding, pt stopped plavix due to bleeding  -trend H/H  -monitor for recurrence of nasal bleeding
Reports baseline thalassemia trait with Hb 10-12, s/p multiple days of epistaxis and packing, no current bleeding, stable h/h  -monitor for recurrence of nasal bleeding
Reports baseline thalassemia trait with Hb 10-12, s/p multiple days of epistaxis and packing, no current bleeding, stable h/h  -monitor for recurrence of nasal bleeding
Reports baseline thalassemia trait with Hb 10-12, s/p multiple days of epistaxis and packing, no current bleeding, pt stopped plavix due to bleeding  -trend H/H  -monitor for recurrence of nasal bleeding

## 2017-11-08 NOTE — PROGRESS NOTE ADULT - PROBLEM SELECTOR PLAN 3
packing dced by ENT.   monitor packing dced by ENT.   monitor for now.  s/p Keflex. No further indication for abx. d/c abx.

## 2017-11-08 NOTE — PROGRESS NOTE ADULT - PROBLEM SELECTOR PLAN 9
pending OP ?lumbar procedure   -PT  -gabapentin

## 2017-11-08 NOTE — PROGRESS NOTE ADULT - PROBLEM SELECTOR PROBLEM 9
Spinal stenosis, unspecified spinal region

## 2017-11-08 NOTE — PROGRESS NOTE ADULT - PROVIDER SPECIALTY LIST ADULT
ENT
Internal Medicine

## 2017-12-22 ENCOUNTER — EMERGENCY (EMERGENCY)
Facility: HOSPITAL | Age: 67
LOS: 1 days | Discharge: HOME | End: 2017-12-22
Attending: EMERGENCY MEDICINE | Admitting: EMERGENCY MEDICINE
Payer: MEDICARE

## 2017-12-22 VITALS
HEART RATE: 78 BPM | SYSTOLIC BLOOD PRESSURE: 172 MMHG | OXYGEN SATURATION: 100 % | RESPIRATION RATE: 18 BRPM | DIASTOLIC BLOOD PRESSURE: 98 MMHG

## 2017-12-22 VITALS
OXYGEN SATURATION: 100 % | SYSTOLIC BLOOD PRESSURE: 172 MMHG | RESPIRATION RATE: 18 BRPM | DIASTOLIC BLOOD PRESSURE: 89 MMHG | HEART RATE: 68 BPM | TEMPERATURE: 98 F

## 2017-12-22 DIAGNOSIS — Z98.1 ARTHRODESIS STATUS: Chronic | ICD-10-CM

## 2017-12-22 DIAGNOSIS — Z98.890 OTHER SPECIFIED POSTPROCEDURAL STATES: Chronic | ICD-10-CM

## 2017-12-22 PROCEDURE — 99284 EMERGENCY DEPT VISIT MOD MDM: CPT | Mod: GC

## 2017-12-22 RX ORDER — GABAPENTIN 400 MG/1
300 CAPSULE ORAL ONCE
Qty: 0 | Refills: 0 | Status: COMPLETED | OUTPATIENT
Start: 2017-12-22 | End: 2017-12-22

## 2017-12-22 RX ORDER — ACETAMINOPHEN 500 MG
650 TABLET ORAL ONCE
Qty: 0 | Refills: 0 | Status: COMPLETED | OUTPATIENT
Start: 2017-12-22 | End: 2017-12-22

## 2017-12-22 RX ADMIN — GABAPENTIN 300 MILLIGRAM(S): 400 CAPSULE ORAL at 14:46

## 2017-12-22 RX ADMIN — Medication 650 MILLIGRAM(S): at 14:45

## 2017-12-22 NOTE — ED ADULT TRIAGE NOTE - CHIEF COMPLAINT QUOTE
nose blood since 8 am on plavix, pt able to maintain own airway and clear secretions. mild bleeding noted

## 2017-12-22 NOTE — ED PROVIDER NOTE - OBJECTIVE STATEMENT
67M PMH HTN, DM, CAD on Plavix, hx of epistaxis requiring rhinorocket in the past, now coming from [place] for epistaxis this morning. Epistaxis started around 8am, unprovoked, resolved around 10:30am. Tried to clean his nose after bleeding stopped to get clots out and bleeding started again. Currently resolved. No other complaints. No NV or SOB. Last hemoglobin 11 from last week as per pt.

## 2017-12-22 NOTE — ED PROVIDER NOTE - ATTENDING CONTRIBUTION TO CARE
I was physically present for the E/M service provided. I agree with above history, physical, and plan which I have reviewed and edited where appropriate. I was physically present for the key portions of the service provided.    67 yr old male with hx of HTN, DM, CAD on Plavix, hx of epistaxis requiring rhinorocket in the past, now coming from [place] for epistaxis this morning x 2.  pt states last episode was 10/30/17 until today.  left nostril started this am at 8 then again at 10 am which since resolved with compression and ice. pt denies digital trauma, on plavix, no hematuria, no hemptysis, no n/v, no abd pain, no sob, no cp, no headache.  states last h/h ~11.    *GEN:   comfortable, in no apparent distress, AOx3  *EYES:   PERRL, extra-occular movements intact  *HEENT:   airway patent, moist mucosal membranes, uvula midline, left nostril small abrasion area without active bleeding, right nostril - clear. no posterior bleed  *CV:   regular rate and rhythm, normal S1/S2, no murmur  *RESP:   clear to auscultation bilaterally, non-labored, speaking in full sentences  *ABD:   soft, non tender, no guarding  *MSK:   no musculoskeletal tenderness, 5/5 strength, moving all extremity  *SKIN:   dry, intact, no rash  *NEURO:   AOx3, no focal weakness or loss of sensation, walks with walker, GCS 15    pt with resolved epistaxis since 10 am.  will monitor for reoccurrence and if no bleed. dc with epstaxis precaution.

## 2017-12-22 NOTE — ED ADULT NURSE NOTE - OBJECTIVE STATEMENT
Pt. A&Ox4, c/o nosebleed out of left nostril starting around 8:30AM. pt. states it subsided around 10AM, then while cleaning up the nose it started to bleed again. Denies any pain (except chronic lower back pain and left hip), dizziness or weakness. No active bleeding at this time. Last nosebleed was on 10/30. Pt. currently at [facility] for weakness and difficulty walking. pmhx spinal stenosis, thalassemia anemia, mitral valve repair (on Plavix), CAD, DM and HTN. MD at bedside for eval. VS done as charted, breathing well on RA. NSR on cardiac monitor. Will continue to monitor.

## 2017-12-22 NOTE — PROVIDER CONTACT NOTE (OTHER) - ASSESSMENT
Medical team referred this case as pt needs an ambulance back to [facility] and generalized weakness.  .Writer spoke with  Pt in the ED.  After this writer called [facility] [phone] spoke with MsVandana Dian to set up ambulance service and pt will be picked up around 5:00 pm through senior care. Non Emergent ambulance form has been signed by MD and attached to the pts envelope for EMS. [facility] in admission was made aware of this plan.

## 2018-01-10 ENCOUNTER — INPATIENT (INPATIENT)
Facility: HOSPITAL | Age: 68
LOS: 8 days | Discharge: NURSING, REHAB OR LONG-TERM CARE | End: 2018-01-19
Attending: INTERNAL MEDICINE | Admitting: INTERNAL MEDICINE
Payer: MEDICARE

## 2018-01-10 VITALS
OXYGEN SATURATION: 99 % | SYSTOLIC BLOOD PRESSURE: 168 MMHG | TEMPERATURE: 98 F | HEART RATE: 78 BPM | RESPIRATION RATE: 20 BRPM | DIASTOLIC BLOOD PRESSURE: 88 MMHG

## 2018-01-10 DIAGNOSIS — M48.061 SPINAL STENOSIS, LUMBAR REGION WITHOUT NEUROGENIC CLAUDICATION: ICD-10-CM

## 2018-01-10 DIAGNOSIS — I10 ESSENTIAL (PRIMARY) HYPERTENSION: ICD-10-CM

## 2018-01-10 DIAGNOSIS — R53.1 WEAKNESS: ICD-10-CM

## 2018-01-10 DIAGNOSIS — E03.9 HYPOTHYROIDISM, UNSPECIFIED: ICD-10-CM

## 2018-01-10 DIAGNOSIS — Z98.1 ARTHRODESIS STATUS: Chronic | ICD-10-CM

## 2018-01-10 DIAGNOSIS — E11.9 TYPE 2 DIABETES MELLITUS WITHOUT COMPLICATIONS: ICD-10-CM

## 2018-01-10 DIAGNOSIS — I25.10 ATHEROSCLEROTIC HEART DISEASE OF NATIVE CORONARY ARTERY WITHOUT ANGINA PECTORIS: ICD-10-CM

## 2018-01-10 DIAGNOSIS — Z98.890 OTHER SPECIFIED POSTPROCEDURAL STATES: Chronic | ICD-10-CM

## 2018-01-10 DIAGNOSIS — N40.0 BENIGN PROSTATIC HYPERPLASIA WITHOUT LOWER URINARY TRACT SYMPTOMS: ICD-10-CM

## 2018-01-10 DIAGNOSIS — Z29.9 ENCOUNTER FOR PROPHYLACTIC MEASURES, UNSPECIFIED: ICD-10-CM

## 2018-01-10 LAB
ALBUMIN SERPL ELPH-MCNC: 4 G/DL — SIGNIFICANT CHANGE UP (ref 3.3–5)
ALP SERPL-CCNC: 59 U/L — SIGNIFICANT CHANGE UP (ref 40–120)
ALT FLD-CCNC: 13 U/L — SIGNIFICANT CHANGE UP (ref 4–41)
ANISOCYTOSIS BLD QL: SLIGHT — SIGNIFICANT CHANGE UP
APPEARANCE UR: CLEAR — SIGNIFICANT CHANGE UP
AST SERPL-CCNC: 19 U/L — SIGNIFICANT CHANGE UP (ref 4–40)
BACTERIA # UR AUTO: SIGNIFICANT CHANGE UP
BASOPHILS # BLD AUTO: 0.03 K/UL — SIGNIFICANT CHANGE UP (ref 0–0.2)
BASOPHILS NFR BLD AUTO: 0.3 % — SIGNIFICANT CHANGE UP (ref 0–2)
BASOPHILS NFR SPEC: 1.7 % — SIGNIFICANT CHANGE UP (ref 0–2)
BILIRUB SERPL-MCNC: 1.2 MG/DL — SIGNIFICANT CHANGE UP (ref 0.2–1.2)
BILIRUB UR-MCNC: NEGATIVE — SIGNIFICANT CHANGE UP
BLASTS # FLD: 0 % — SIGNIFICANT CHANGE UP (ref 0–0)
BLOOD UR QL VISUAL: NEGATIVE — SIGNIFICANT CHANGE UP
BUN SERPL-MCNC: 17 MG/DL — SIGNIFICANT CHANGE UP (ref 7–23)
CALCIUM SERPL-MCNC: 9.2 MG/DL — SIGNIFICANT CHANGE UP (ref 8.4–10.5)
CHLORIDE SERPL-SCNC: 99 MMOL/L — SIGNIFICANT CHANGE UP (ref 98–107)
CO2 SERPL-SCNC: 33 MMOL/L — HIGH (ref 22–31)
COLOR SPEC: YELLOW — SIGNIFICANT CHANGE UP
CREAT SERPL-MCNC: 0.58 MG/DL — SIGNIFICANT CHANGE UP (ref 0.5–1.3)
DACRYOCYTES BLD QL SMEAR: SLIGHT — SIGNIFICANT CHANGE UP
EOSINOPHIL # BLD AUTO: 0.05 K/UL — SIGNIFICANT CHANGE UP (ref 0–0.5)
EOSINOPHIL NFR BLD AUTO: 0.5 % — SIGNIFICANT CHANGE UP (ref 0–6)
EOSINOPHIL NFR FLD: 0 % — SIGNIFICANT CHANGE UP (ref 0–6)
GIANT PLATELETS BLD QL SMEAR: PRESENT — SIGNIFICANT CHANGE UP
GLUCOSE SERPL-MCNC: 90 MG/DL — SIGNIFICANT CHANGE UP (ref 70–99)
GLUCOSE UR-MCNC: NEGATIVE — SIGNIFICANT CHANGE UP
HCT VFR BLD CALC: 40.3 % — SIGNIFICANT CHANGE UP (ref 39–50)
HGB BLD-MCNC: 12.2 G/DL — LOW (ref 13–17)
HYALINE CASTS # UR AUTO: SIGNIFICANT CHANGE UP (ref 0–?)
HYPOCHROMIA BLD QL: SIGNIFICANT CHANGE UP
IMM GRANULOCYTES # BLD AUTO: 0.02 # — SIGNIFICANT CHANGE UP
IMM GRANULOCYTES NFR BLD AUTO: 0.2 % — SIGNIFICANT CHANGE UP (ref 0–1.5)
KETONES UR-MCNC: NEGATIVE — SIGNIFICANT CHANGE UP
LEUKOCYTE ESTERASE UR-ACNC: NEGATIVE — SIGNIFICANT CHANGE UP
LYMPHOCYTES # BLD AUTO: 0.89 K/UL — LOW (ref 1–3.3)
LYMPHOCYTES # BLD AUTO: 9.2 % — LOW (ref 13–44)
LYMPHOCYTES NFR SPEC AUTO: 7.8 % — LOW (ref 13–44)
MCHC RBC-ENTMCNC: 18.4 PG — LOW (ref 27–34)
MCHC RBC-ENTMCNC: 30.3 % — LOW (ref 32–36)
MCV RBC AUTO: 60.7 FL — LOW (ref 80–100)
METAMYELOCYTES # FLD: 0 % — SIGNIFICANT CHANGE UP (ref 0–1)
MICROCYTES BLD QL: SIGNIFICANT CHANGE UP
MONOCYTES # BLD AUTO: 0.88 K/UL — SIGNIFICANT CHANGE UP (ref 0–0.9)
MONOCYTES NFR BLD AUTO: 9.1 % — SIGNIFICANT CHANGE UP (ref 2–14)
MONOCYTES NFR BLD: 5.2 % — SIGNIFICANT CHANGE UP (ref 2–9)
MUCOUS THREADS # UR AUTO: SIGNIFICANT CHANGE UP
MYELOCYTES NFR BLD: 0 % — SIGNIFICANT CHANGE UP (ref 0–0)
NEUTROPHIL AB SER-ACNC: 81.8 % — HIGH (ref 43–77)
NEUTROPHILS # BLD AUTO: 7.77 K/UL — HIGH (ref 1.8–7.4)
NEUTROPHILS NFR BLD AUTO: 80.7 % — HIGH (ref 43–77)
NEUTS BAND # BLD: 0 % — SIGNIFICANT CHANGE UP (ref 0–6)
NITRITE UR-MCNC: NEGATIVE — SIGNIFICANT CHANGE UP
NON-SQ EPI CELLS # UR AUTO: <1 — SIGNIFICANT CHANGE UP
NRBC # FLD: 0 — SIGNIFICANT CHANGE UP
OTHER - HEMATOLOGY %: 0 — SIGNIFICANT CHANGE UP
OVALOCYTES BLD QL SMEAR: SIGNIFICANT CHANGE UP
PH UR: 6 — SIGNIFICANT CHANGE UP (ref 4.6–8)
PLATELET # BLD AUTO: 200 K/UL — SIGNIFICANT CHANGE UP (ref 150–400)
PLATELET COUNT - ESTIMATE: NORMAL — SIGNIFICANT CHANGE UP
PMV BLD: SIGNIFICANT CHANGE UP FL (ref 7–13)
POIKILOCYTOSIS BLD QL AUTO: SLIGHT — SIGNIFICANT CHANGE UP
POTASSIUM SERPL-MCNC: 3.3 MMOL/L — LOW (ref 3.5–5.3)
POTASSIUM SERPL-SCNC: 3.3 MMOL/L — LOW (ref 3.5–5.3)
PROMYELOCYTES # FLD: 0 % — SIGNIFICANT CHANGE UP (ref 0–0)
PROT SERPL-MCNC: 6.9 G/DL — SIGNIFICANT CHANGE UP (ref 6–8.3)
PROT UR-MCNC: 30 MG/DL — HIGH
RBC # BLD: 6.64 M/UL — HIGH (ref 4.2–5.8)
RBC # FLD: 19.6 % — HIGH (ref 10.3–14.5)
RBC CASTS # UR COMP ASSIST: SIGNIFICANT CHANGE UP (ref 0–?)
SCHISTOCYTES BLD QL AUTO: SLIGHT — SIGNIFICANT CHANGE UP
SODIUM SERPL-SCNC: 142 MMOL/L — SIGNIFICANT CHANGE UP (ref 135–145)
SP GR SPEC: 1.02 — SIGNIFICANT CHANGE UP (ref 1–1.04)
TARGETS BLD QL SMEAR: SIGNIFICANT CHANGE UP
UROBILINOGEN FLD QL: NORMAL MG/DL — SIGNIFICANT CHANGE UP
VARIANT LYMPHS # BLD: 3.5 % — SIGNIFICANT CHANGE UP
WBC # BLD: 9.64 K/UL — SIGNIFICANT CHANGE UP (ref 3.8–10.5)
WBC # FLD AUTO: 9.64 K/UL — SIGNIFICANT CHANGE UP (ref 3.8–10.5)
WBC UR QL: SIGNIFICANT CHANGE UP (ref 0–?)

## 2018-01-10 PROCEDURE — 99223 1ST HOSP IP/OBS HIGH 75: CPT

## 2018-01-10 PROCEDURE — 72110 X-RAY EXAM L-2 SPINE 4/>VWS: CPT | Mod: 26

## 2018-01-10 PROCEDURE — 73522 X-RAY EXAM HIPS BI 3-4 VIEWS: CPT | Mod: 26

## 2018-01-10 RX ORDER — HEPARIN SODIUM 5000 [USP'U]/ML
5000 INJECTION INTRAVENOUS; SUBCUTANEOUS EVERY 8 HOURS
Qty: 0 | Refills: 0 | Status: DISCONTINUED | OUTPATIENT
Start: 2018-01-10 | End: 2018-01-19

## 2018-01-10 RX ORDER — LOSARTAN POTASSIUM 100 MG/1
25 TABLET, FILM COATED ORAL DAILY
Qty: 0 | Refills: 0 | Status: DISCONTINUED | OUTPATIENT
Start: 2018-01-10 | End: 2018-01-11

## 2018-01-10 RX ORDER — LEVOTHYROXINE SODIUM 125 MCG
112 TABLET ORAL DAILY
Qty: 0 | Refills: 0 | Status: DISCONTINUED | OUTPATIENT
Start: 2018-01-10 | End: 2018-01-19

## 2018-01-10 RX ORDER — CLOPIDOGREL BISULFATE 75 MG/1
75 TABLET, FILM COATED ORAL DAILY
Qty: 0 | Refills: 0 | Status: DISCONTINUED | OUTPATIENT
Start: 2018-01-10 | End: 2018-01-11

## 2018-01-10 RX ORDER — METOPROLOL TARTRATE 50 MG
50 TABLET ORAL DAILY
Qty: 0 | Refills: 0 | Status: DISCONTINUED | OUTPATIENT
Start: 2018-01-10 | End: 2018-01-19

## 2018-01-10 RX ORDER — GABAPENTIN 400 MG/1
2 CAPSULE ORAL
Qty: 0 | Refills: 0 | COMMUNITY

## 2018-01-10 RX ORDER — SIMVASTATIN 20 MG/1
40 TABLET, FILM COATED ORAL AT BEDTIME
Qty: 0 | Refills: 0 | Status: DISCONTINUED | OUTPATIENT
Start: 2018-01-10 | End: 2018-01-19

## 2018-01-10 RX ORDER — ASCORBIC ACID 60 MG
500 TABLET,CHEWABLE ORAL DAILY
Qty: 0 | Refills: 0 | Status: DISCONTINUED | OUTPATIENT
Start: 2018-01-10 | End: 2018-01-19

## 2018-01-10 RX ORDER — SENNA PLUS 8.6 MG/1
2 TABLET ORAL AT BEDTIME
Qty: 0 | Refills: 0 | Status: DISCONTINUED | OUTPATIENT
Start: 2018-01-10 | End: 2018-01-19

## 2018-01-10 RX ORDER — FERROUS SULFATE 325(65) MG
325 TABLET ORAL DAILY
Qty: 0 | Refills: 0 | Status: DISCONTINUED | OUTPATIENT
Start: 2018-01-10 | End: 2018-01-19

## 2018-01-10 RX ORDER — GABAPENTIN 400 MG/1
300 CAPSULE ORAL EVERY 8 HOURS
Qty: 0 | Refills: 0 | Status: DISCONTINUED | OUTPATIENT
Start: 2018-01-10 | End: 2018-01-19

## 2018-01-10 RX ORDER — TAMSULOSIN HYDROCHLORIDE 0.4 MG/1
0.4 CAPSULE ORAL AT BEDTIME
Qty: 0 | Refills: 0 | Status: DISCONTINUED | OUTPATIENT
Start: 2018-01-10 | End: 2018-01-19

## 2018-01-10 RX ORDER — ACETAMINOPHEN 500 MG
650 TABLET ORAL ONCE
Qty: 0 | Refills: 0 | Status: COMPLETED | OUTPATIENT
Start: 2018-01-10 | End: 2018-01-10

## 2018-01-10 RX ORDER — INFLUENZA VIRUS VACCINE 15; 15; 15; 15 UG/.5ML; UG/.5ML; UG/.5ML; UG/.5ML
0.5 SUSPENSION INTRAMUSCULAR ONCE
Qty: 0 | Refills: 0 | Status: COMPLETED | OUTPATIENT
Start: 2018-01-10 | End: 2018-01-10

## 2018-01-10 RX ORDER — ASPIRIN/CALCIUM CARB/MAGNESIUM 324 MG
81 TABLET ORAL DAILY
Qty: 0 | Refills: 0 | Status: DISCONTINUED | OUTPATIENT
Start: 2018-01-10 | End: 2018-01-11

## 2018-01-10 RX ORDER — HYDROCHLOROTHIAZIDE 25 MG
12.5 TABLET ORAL DAILY
Qty: 0 | Refills: 0 | Status: DISCONTINUED | OUTPATIENT
Start: 2018-01-10 | End: 2018-01-11

## 2018-01-10 RX ADMIN — Medication 650 MILLIGRAM(S): at 22:52

## 2018-01-10 RX ADMIN — Medication 81 MILLIGRAM(S): at 18:33

## 2018-01-10 RX ADMIN — Medication 12.5 MILLIGRAM(S): at 18:32

## 2018-01-10 RX ADMIN — CLOPIDOGREL BISULFATE 75 MILLIGRAM(S): 75 TABLET, FILM COATED ORAL at 18:33

## 2018-01-10 RX ADMIN — SENNA PLUS 2 TABLET(S): 8.6 TABLET ORAL at 21:50

## 2018-01-10 RX ADMIN — Medication 50 MILLIGRAM(S): at 18:33

## 2018-01-10 RX ADMIN — SIMVASTATIN 40 MILLIGRAM(S): 20 TABLET, FILM COATED ORAL at 21:50

## 2018-01-10 RX ADMIN — GABAPENTIN 300 MILLIGRAM(S): 400 CAPSULE ORAL at 21:50

## 2018-01-10 RX ADMIN — Medication 325 MILLIGRAM(S): at 18:33

## 2018-01-10 RX ADMIN — Medication 500 MILLIGRAM(S): at 18:33

## 2018-01-10 RX ADMIN — TAMSULOSIN HYDROCHLORIDE 0.4 MILLIGRAM(S): 0.4 CAPSULE ORAL at 21:50

## 2018-01-10 NOTE — ED PROVIDER NOTE - OBJECTIVE STATEMENT
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 Pt states that he has been ambulating with a walker and was walking short distances while in [place] but at home his left hip "buckled" and he fell to the ground secondary to feeling weak. 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 Pt states that he has been ambulating with a walker and was walking short distances while in [place] but at home his left hip "buckled" and he fell to the ground secondary to feeling weak. Since last night pt unable to ambulate on affected extremity. Denies head trauma, LOC, n/v/f/c, CP, SOB, abdominal pain, dysuria, hematuria, diarrhea, constipation, bowel bladder incontinence, saddle anesthesia, numbness/tingling extremities.

## 2018-01-10 NOTE — ED PROVIDER NOTE - CHPI ED SYMPTOMS NEG
no vomiting/no deformity/no loss of consciousness/no numbness/no abrasion/no bleeding/no fever/no confusion

## 2018-01-10 NOTE — H&P ADULT - PROBLEM SELECTOR PLAN 4
Place on insulin sliding scale and Diet with consistent carbohydrates and DASH. Assess for need for basal/bolus insulin therapy

## 2018-01-10 NOTE — ED PROVIDER NOTE - MEDICAL DECISION MAKING DETAILS
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  Plan: cbc, cmp, UA, urine culture, xray hip/pelvis, lumbosacral

## 2018-01-10 NOTE — H&P ADULT - HISTORY OF PRESENT ILLNESS
67M hx of CAD s/p 4 stents (6/2017), Mitral Valve Regurgitation s/p Repair (6/2017), Thalassemia, Cervical and Lumbar Spinal Stenosis (C3-C7 fusion performed 3/2013), Type 2 Diabetes, presents to [facility] ED for b/l LE weakness. Patient was recently discharged from [facility] after a 2 month stay. After his rehab stay, the patient went to his home, where he found it very difficult to consistently get around his house without feeling like his left leg and hip were going to "give out from under me." The patient also feels his right side is weak, but not nearly as much as his left side. He denies loss of energy or any pain. He denies bowel/bladder incontinence. Last night he had a fall leaving him on the floor for sometime, and because of that he felt he cannot live alone in his home anymore, and he came to the ER for workup of his lower extremities.     In the ED patient underwent blood work and x-rays    Of note, patient says he was supposed to have surgery for lumbar spinal stenosis in 2017, however because of his SONIA stents his orthopedist has not been able to perform this surgery this past year.

## 2018-01-10 NOTE — H&P ADULT - NSHPREVIEWOFSYSTEMS_GEN_ALL_CORE
REVIEW OF SYSTEMS:    CONSTITUTIONAL: No weakness, fevers or chills  EYES/ENT: No visual changes;  No vertigo or throat pain   NECK: No pain or stiffness  RESPIRATORY: No cough, wheezing, hemoptysis; No shortness of breath  CARDIOVASCULAR: No chest pain or palpitations  GASTROINTESTINAL: No abdominal or epigastric pain. No nausea, vomiting, or hematemesis; No diarrhea or constipation. No melena or hematochezia.  GENITOURINARY: No dysuria, frequency or hematuria  NEUROLOGICAL: (+) LE weakness, LLE >>> RLE   SKIN: No itching, burning, rashes, or lesions   All other review of systems is negative unless indicated above.

## 2018-01-10 NOTE — H&P ADULT - PROBLEM SELECTOR PLAN 1
Patient says he has known spinal stenosis of lumbar region which has been chronically giving him back pain and LE weakness. Was supposed to have surgical intervention in 2017, but due to acute finding of CAD, he has had to be on DAPT for 6 months and therefore had put off his surgery. His stents were placed in June 2017, so patient by now should begin process of evaluation possibly addressing spinal stenosis of his lumbar spine  - MRI w/o contrast of L-spine   - Pain control PRN   - Orthopedic Consultation after completion of MRI to assess if surgical intervention would fix patient weakness  - SW consult for possible assistance to NH, Assisted Living as patient says he ran out of JAN days  - PT consult

## 2018-01-10 NOTE — H&P ADULT - NSHPPHYSICALEXAM_GEN_ALL_CORE
Vital Signs Last 24 Hrs  T(C): 36.2 (10 Amilcar 2018 16:10), Max: 36.7 (10 Amilcar 2018 10:03)  T(F): 97.1 (10 Amilcar 2018 16:10), Max: 98 (10 Amilcar 2018 10:03)  HR: 79 (10 Amilcar 2018 16:10) (75 - 79)  BP: 153/95 (10 Amilcar 2018 16:10) (153/95 - 178/85)  BP(mean): --  RR: 17 (10 Amilcar 2018 16:10) (16 - 20)  SpO2: 97% (10 Amilcar 2018 16:10) (97% - 100%)    General: NAD, non-toxic appearing, speaking in full sentences   HEENT: EOMI, PERRLA, no conjunctival pallor, MMM, no JVD, no thyromegaly, neck supple, trachea midline  CV: S1S2 RRR no MRG  Lungs: CTA BL  Abdomen: soft NTND +BS, obese   Extremities: No CCE +WWP  Skin/MSK: No rashes, preserved ROM on active & passive movement  Neuro: AAOx3, 5/5 strength LUE, RUE; 4/5 strength RLE, 3/5 LLE, no sensory deficits

## 2018-01-10 NOTE — ED PROVIDER NOTE - ATTENDING CONTRIBUTION TO CARE
JOSELINE Attending Note - Dr. 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 no lymphadenopathy or thyroid enlargement, No JVD.  Chest clear to P&A, Heart- reg rhythm without murmur, rubs or gallops, radial pulses equal bilaterally.  Abd is soft, non-tender, Bowel sounds are active. no mass or organomegaly. : No CVA tenderness. Neuro:  Pt alert and oriented x 3. Perrl    Distal neurosensory is intact. Motor function is 5/5 strength bilaterally.  No focal deficits. Extremities:  No edema.  Skin: warm and dry with abrasions on left anterior shin.  Impression:  Weakness  Plan: Labs with U/A looking for causes of weakness: infection, electrolyte abnormality, dehydration, EKG looking for cardiac etiology,  endocrinopathies such as Thyroid, and hyperglycemia, CXR, xray of left hip

## 2018-01-10 NOTE — PATIENT PROFILE ADULT. - VISION (WITH CORRECTIVE LENSES IF THE PATIENT USUALLY WEARS THEM):
eye glasses/Partially impaired: cannot see medication labels or newsprint, but can see obstacles in path, and the surrounding layout; can count fingers at arm's length

## 2018-01-10 NOTE — ED PROVIDER NOTE - ENMT, MLM
Airway patent, Nose No congestion, throat- membranes dry,  No swelling or exudate. Neck supple. No JVD, No lymphadenopathy

## 2018-01-10 NOTE — ED ADULT TRIAGE NOTE - CHIEF COMPLAINT QUOTE
S/P fall yest. after he was D/C from [facility] c/o left hip/leg pain denies numbness unable to wear weight, pt AOX 3

## 2018-01-10 NOTE — H&P ADULT - ASSESSMENT
67M hx of CAD s/p 4 stents (6/2017), Mitral Valve Regurgitation s/p Repair (6/2017), Thalassemia, Cervical and Lumbar Spinal Stenosis (C3-C7 fusion performed 3/2013), Type 2 Diabetes, presents to [facility] ED for b/l LE weakness, left greater than right.

## 2018-01-11 LAB
BACTERIA UR CULT: SIGNIFICANT CHANGE UP
BUN SERPL-MCNC: 14 MG/DL — SIGNIFICANT CHANGE UP (ref 7–23)
CALCIUM SERPL-MCNC: 9 MG/DL — SIGNIFICANT CHANGE UP (ref 8.4–10.5)
CHLORIDE SERPL-SCNC: 98 MMOL/L — SIGNIFICANT CHANGE UP (ref 98–107)
CO2 SERPL-SCNC: 31 MMOL/L — SIGNIFICANT CHANGE UP (ref 22–31)
CREAT SERPL-MCNC: 0.57 MG/DL — SIGNIFICANT CHANGE UP (ref 0.5–1.3)
FERRITIN SERPL-MCNC: 59.96 NG/ML — SIGNIFICANT CHANGE UP (ref 30–400)
GLUCOSE BLDC GLUCOMTR-MCNC: 107 MG/DL — HIGH (ref 70–99)
GLUCOSE BLDC GLUCOMTR-MCNC: 156 MG/DL — HIGH (ref 70–99)
GLUCOSE BLDC GLUCOMTR-MCNC: 86 MG/DL — SIGNIFICANT CHANGE UP (ref 70–99)
GLUCOSE BLDC GLUCOMTR-MCNC: 92 MG/DL — SIGNIFICANT CHANGE UP (ref 70–99)
GLUCOSE SERPL-MCNC: 146 MG/DL — HIGH (ref 70–99)
HBA1C BLD-MCNC: 5.6 % — SIGNIFICANT CHANGE UP (ref 4–5.6)
HCT VFR BLD CALC: 41.3 % — SIGNIFICANT CHANGE UP (ref 39–50)
HGB BLD-MCNC: 12.5 G/DL — LOW (ref 13–17)
IRON SATN MFR SERPL: 244 UG/DL — SIGNIFICANT CHANGE UP (ref 155–535)
IRON SATN MFR SERPL: 53 UG/DL — SIGNIFICANT CHANGE UP (ref 45–165)
MAGNESIUM SERPL-MCNC: 1.5 MG/DL — LOW (ref 1.6–2.6)
MCHC RBC-ENTMCNC: 18.2 PG — LOW (ref 27–34)
MCHC RBC-ENTMCNC: 30.3 % — LOW (ref 32–36)
MCV RBC AUTO: 60.1 FL — LOW (ref 80–100)
NRBC # FLD: 0 — SIGNIFICANT CHANGE UP
PHOSPHATE SERPL-MCNC: 3.2 MG/DL — SIGNIFICANT CHANGE UP (ref 2.5–4.5)
PLATELET # BLD AUTO: 170 K/UL — SIGNIFICANT CHANGE UP (ref 150–400)
PMV BLD: SIGNIFICANT CHANGE UP FL (ref 7–13)
POTASSIUM SERPL-MCNC: 3.7 MMOL/L — SIGNIFICANT CHANGE UP (ref 3.5–5.3)
POTASSIUM SERPL-SCNC: 3.7 MMOL/L — SIGNIFICANT CHANGE UP (ref 3.5–5.3)
RBC # BLD: 6.87 M/UL — HIGH (ref 4.2–5.8)
RBC # FLD: 19.8 % — HIGH (ref 10.3–14.5)
SODIUM SERPL-SCNC: 140 MMOL/L — SIGNIFICANT CHANGE UP (ref 135–145)
SPECIMEN SOURCE: SIGNIFICANT CHANGE UP
UIBC SERPL-MCNC: 191 UG/DL — SIGNIFICANT CHANGE UP (ref 110–370)
WBC # BLD: 10.14 K/UL — SIGNIFICANT CHANGE UP (ref 3.8–10.5)
WBC # FLD AUTO: 10.14 K/UL — SIGNIFICANT CHANGE UP (ref 3.8–10.5)

## 2018-01-11 PROCEDURE — 72148 MRI LUMBAR SPINE W/O DYE: CPT | Mod: 26

## 2018-01-11 RX ORDER — SIMVASTATIN 20 MG/1
1 TABLET, FILM COATED ORAL
Qty: 0 | Refills: 0 | COMMUNITY

## 2018-01-11 RX ORDER — HYDROCHLOROTHIAZIDE 25 MG
25 TABLET ORAL DAILY
Qty: 0 | Refills: 0 | Status: DISCONTINUED | OUTPATIENT
Start: 2018-01-11 | End: 2018-01-19

## 2018-01-11 RX ORDER — TRAMADOL HYDROCHLORIDE 50 MG/1
50 TABLET ORAL DAILY
Qty: 0 | Refills: 0 | Status: DISCONTINUED | OUTPATIENT
Start: 2018-01-11 | End: 2018-01-18

## 2018-01-11 RX ORDER — FERROUS SULFATE 325(65) MG
1 TABLET ORAL
Qty: 0 | Refills: 0 | COMMUNITY

## 2018-01-11 RX ORDER — TAMSULOSIN HYDROCHLORIDE 0.4 MG/1
1 CAPSULE ORAL
Qty: 0 | Refills: 0 | COMMUNITY

## 2018-01-11 RX ORDER — MAGNESIUM SULFATE 500 MG/ML
1 VIAL (ML) INJECTION ONCE
Qty: 0 | Refills: 0 | Status: COMPLETED | OUTPATIENT
Start: 2018-01-11 | End: 2018-01-11

## 2018-01-11 RX ORDER — GABAPENTIN 400 MG/1
3 CAPSULE ORAL
Qty: 0 | Refills: 0 | COMMUNITY

## 2018-01-11 RX ORDER — TRAMADOL HYDROCHLORIDE 50 MG/1
50 TABLET ORAL
Qty: 0 | Refills: 0 | COMMUNITY

## 2018-01-11 RX ORDER — POTASSIUM CHLORIDE 20 MEQ
20 PACKET (EA) ORAL ONCE
Qty: 0 | Refills: 0 | Status: COMPLETED | OUTPATIENT
Start: 2018-01-11 | End: 2018-01-11

## 2018-01-11 RX ORDER — LOSARTAN POTASSIUM 100 MG/1
50 TABLET, FILM COATED ORAL DAILY
Qty: 0 | Refills: 0 | Status: DISCONTINUED | OUTPATIENT
Start: 2018-01-11 | End: 2018-01-19

## 2018-01-11 RX ORDER — ASPIRIN/CALCIUM CARB/MAGNESIUM 324 MG
1 TABLET ORAL
Qty: 0 | Refills: 0 | COMMUNITY

## 2018-01-11 RX ORDER — CLOPIDOGREL BISULFATE 75 MG/1
1 TABLET, FILM COATED ORAL
Qty: 0 | Refills: 0 | COMMUNITY

## 2018-01-11 RX ORDER — METOPROLOL TARTRATE 50 MG
1 TABLET ORAL
Qty: 0 | Refills: 0 | COMMUNITY

## 2018-01-11 RX ORDER — SENNA PLUS 8.6 MG/1
2 TABLET ORAL
Qty: 0 | Refills: 0 | COMMUNITY

## 2018-01-11 RX ORDER — DEXAMETHASONE 0.5 MG/5ML
10 ELIXIR ORAL ONCE
Qty: 0 | Refills: 0 | Status: COMPLETED | OUTPATIENT
Start: 2018-01-11 | End: 2018-01-11

## 2018-01-11 RX ORDER — MORPHINE SULFATE 50 MG/1
2 CAPSULE, EXTENDED RELEASE ORAL EVERY 4 HOURS
Qty: 0 | Refills: 0 | Status: DISCONTINUED | OUTPATIENT
Start: 2018-01-11 | End: 2018-01-11

## 2018-01-11 RX ADMIN — LOSARTAN POTASSIUM 25 MILLIGRAM(S): 100 TABLET, FILM COATED ORAL at 07:11

## 2018-01-11 RX ADMIN — Medication 325 MILLIGRAM(S): at 13:16

## 2018-01-11 RX ADMIN — Medication 50 MILLIGRAM(S): at 07:11

## 2018-01-11 RX ADMIN — Medication 500 MILLIGRAM(S): at 13:16

## 2018-01-11 RX ADMIN — Medication 100 GRAM(S): at 16:38

## 2018-01-11 RX ADMIN — GABAPENTIN 300 MILLIGRAM(S): 400 CAPSULE ORAL at 13:16

## 2018-01-11 RX ADMIN — GABAPENTIN 300 MILLIGRAM(S): 400 CAPSULE ORAL at 22:06

## 2018-01-11 RX ADMIN — TRAMADOL HYDROCHLORIDE 50 MILLIGRAM(S): 50 TABLET ORAL at 09:54

## 2018-01-11 RX ADMIN — Medication 112 MICROGRAM(S): at 07:36

## 2018-01-11 RX ADMIN — Medication 81 MILLIGRAM(S): at 13:16

## 2018-01-11 RX ADMIN — Medication 102 MILLIGRAM(S): at 17:45

## 2018-01-11 RX ADMIN — SENNA PLUS 2 TABLET(S): 8.6 TABLET ORAL at 22:06

## 2018-01-11 RX ADMIN — GABAPENTIN 300 MILLIGRAM(S): 400 CAPSULE ORAL at 07:11

## 2018-01-11 RX ADMIN — CLOPIDOGREL BISULFATE 75 MILLIGRAM(S): 75 TABLET, FILM COATED ORAL at 13:16

## 2018-01-11 RX ADMIN — TAMSULOSIN HYDROCHLORIDE 0.4 MILLIGRAM(S): 0.4 CAPSULE ORAL at 22:06

## 2018-01-11 RX ADMIN — Medication 20 MILLIEQUIVALENT(S): at 16:38

## 2018-01-11 RX ADMIN — Medication 12.5 MILLIGRAM(S): at 07:11

## 2018-01-11 RX ADMIN — SIMVASTATIN 40 MILLIGRAM(S): 20 TABLET, FILM COATED ORAL at 22:07

## 2018-01-11 NOTE — PROGRESS NOTE ADULT - SUBJECTIVE AND OBJECTIVE BOX
Patient is a 67y old  Male who presents with a chief complaint of couldn't walk or stand, weakness. (10 Amilcar 2018 17:14)  pt seen and examined at bedside   SUBJECTIVE/OVERNIGHT events noted, feels well     Vital Signs Last 24 Hrs  T(C): 36.3 (11 Jan 2018 21:30), Max: 37.4 (11 Jan 2018 14:53)  T(F): 97.4 (11 Jan 2018 21:30), Max: 99.4 (11 Jan 2018 14:53)  HR: 75 (11 Jan 2018 21:30) (67 - 75)  BP: 164/99 (11 Jan 2018 21:30) (151/82 - 164/99)  BP(mean): --  RR: 18 (11 Jan 2018 21:30) (18 - 18)  SpO2: 98% (11 Jan 2018 21:30) (95% - 100%)  CAPILLARY BLOOD GLUCOSE      POCT Blood Glucose.: 156 mg/dL (11 Jan 2018 22:17)  POCT Blood Glucose.: 107 mg/dL (11 Jan 2018 17:56)  POCT Blood Glucose.: 92 mg/dL (11 Jan 2018 12:46)  POCT Blood Glucose.: 86 mg/dL (11 Jan 2018 09:17)    MEDICATIONS  (STANDING):  ascorbic acid 500 milliGRAM(s) Oral daily  aspirin enteric coated 81 milliGRAM(s) Oral daily  clopidogrel Tablet 75 milliGRAM(s) Oral daily  ferrous    sulfate 325 milliGRAM(s) Oral daily  gabapentin 300 milliGRAM(s) Oral every 8 hours  heparin  Injectable 5000 Unit(s) SubCutaneous every 8 hours  hydrochlorothiazide 25 milliGRAM(s) Oral daily  influenza   Vaccine 0.5 milliLiter(s) IntraMuscular once  levothyroxine 112 MICROGram(s) Oral daily  losartan 50 milliGRAM(s) Oral daily  metoprolol succinate ER 50 milliGRAM(s) Oral daily  senna 2 Tablet(s) Oral at bedtime  simvastatin 40 milliGRAM(s) Oral at bedtime  tamsulosin 0.4 milliGRAM(s) Oral at bedtime  traMADol 50 milliGRAM(s) Oral daily    MEDICATIONS  (PRN):    PHYSICAL EXAM  General : comfortable, not in any acute distress  Neck : supple, no LAD, no JVD  Eyes : EOMI, PERRLA, conjunctiva : no icterus, no pallor  MM moist, no pharyngeal erythema/exudates  Pulmonary: clear to auscultation bilateral lung fields, no crackles/rhonchi/wheezing,   CVS : S1 S2,rate normal-,rhythm-regular, no murmurs, no abnormal sounds  Gastrointestinal: soft, non tender, non distended, no organomegaly , bowel sounds audible  Extremities: no edema  Neuro: AAOx3, no focal deficits  Musculoskeletal:  FROM of all ext  Skin: no rash  LABS                        12.5   10.14 )-----------( 170      ( 11 Jan 2018 10:15 )             41.3     01-11    140  |  98  |  14  ----------------------------<  146<H>  3.7   |  31  |  0.57    Ca    9.0      11 Jan 2018 10:15  Phos  3.2     01-11  Mg     1.5     01-11    TPro  6.9  /  Alb  4.0  /  TBili  1.2  /  DBili  x   /  AST  19  /  ALT  13  /  AlkPhos  59  01-10      Culture - Urine (collected 10 Amilcar 2018 11:37)  Source: URINE MIDSTREAM  Final Report (11 Jan 2018 10:18):    NO GROWTH AT 24 HOURS      RADIOLOGY and IMAGING reviewed: yes  Consultant notes reviewed : yes  Care discussed with Consultants/other providers :

## 2018-01-11 NOTE — ADVANCED PRACTICE NURSE CONSULT - RECOMMEDATIONS
Topical recommendations:    Right scapula- healing surgical wound- Apply liquid barrier film, cover with silicone foam with border. Change every three days.    Right anterior lower leg- multiple scabs from fall- Gently cleanse with NS. Pat dry. Apply Liquid barrier film. Cover with silicone foam without border, secure with spandage tubular stretch netting. Change every 72 hours.    Sacrum extending to bilateral buttocks- Cleanse with skin cleanser, dry well. Apply liquid barrier film to periwound skin and areas of hypopigmentation. Cover with Silicone foam with border. Change daily.    Continue low air loss bed therapy, apply liquid barrier film to bilateral heels and right achilles daily, continue heel elevation with Z-flex fluidized positioning boots, continue to turn & reposition q2h with Z-ghulam fluidized positioning device, soft pillow between bony prominences, continue moisture management with barrier creams & single breathable pad, continue measures to decrease friction/shear/pressure.     Continue with nutritional support as per dietary/orders.    Findings and plan discussed with patient and primary team. Patient educated on topical wound therapy. All questions and concerns addressed.     Please contact Wound Care Service Line if we can be of further assistance (ext [phone]). Topical recommendations:    Right scapula- healing surgical wound- Apply liquid barrier film, cover with silicone foam with border. Change every three days.    Right anterior lower leg- multiple scabs from fall- Gently cleanse with NS. Pat dry. Apply Liquid barrier film. Cover with silicone foam without border, secure with spandage tubular stretch netting. Change every 72 hours. (Note: patient seen on 1/17/18 for reconsult of open scabs, upon review of original consult scabs on Left anterior lower leg as described in assessment above, all care has been provided to Left lower leg as recommended).    Sacrum extending to bilateral buttocks- Cleanse with skin cleanser, dry well. Apply liquid barrier film to periwound skin and areas of hypopigmentation. Cover with Silicone foam with border. Change daily.    Continue low air loss bed therapy, apply liquid barrier film to bilateral heels and right achilles daily, continue heel elevation with Z-flex fluidized positioning boots, continue to turn & reposition q2h with Z-ghulam fluidized positioning device, soft pillow between bony prominences, continue moisture management with barrier creams & single breathable pad, continue measures to decrease friction/shear/pressure.     Continue with nutritional support as per dietary/orders.    Findings and plan discussed with patient and primary team. Patient educated on topical wound therapy. All questions and concerns addressed.     Please contact Wound Care Service Line if we can be of further assistance (ext [phone]).

## 2018-01-11 NOTE — ADVANCED PRACTICE NURSE CONSULT - REASON FOR CONSULT
Patient seen on skin care rounds after wound care referral received for assessment of skin impairment and recommendations of topical management. Chart reviewed: Serum albumin 4.0 g/dL, HgbA1C 5.6%, Lisandro 15, BMI 30.2kg/m2. Patient interviewed, stated that he was previously at [facility], when he got home he fell from weakness he felt in his lower extremities and sustained abrasions to his left leg. 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  Patient H/O CAD s/p 4 stents (6/2017), Mitral Valve Regurgitation s/p Repair (6/2017), Thalassemia, Cervical and Lumbar Spinal Stenosis (C3-C7 fusion performed 3/2013), Type 2 Diabetes, presents to [facility] ED for b/l LE weakness. [facility]. After his rehab stay, the patient went to his home, where he found it very difficult to consistently get around his house without feeling like his left leg and hip were going to "give out from under me." The patient also feels his right side is weak, but not nearly as much as his left side. He denies loss of energy or any pain. He denies bowel/bladder incontinence. Last night he had a fall leaving him on the floor for sometime, and because of that he felt he cannot live alone in his home anymore, and he came to the ER for workup of his lower extremities. Followed by medicine team. Patient seen on skin care rounds after wound care referral received for assessment of skin impairment and recommendations of topical management. Chart reviewed: Serum albumin 4.0 g/dL, HgbA1C 5.6%, Lisandro 15, BMI 30.2kg/m2. Patient interviewed, stated that he was previously at [facility], when he got home he fell from weakness he felt in his lower extremities and sustained abrasions to his left leg. 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  Also stated that he had a pressure injury on his sacrum, that spontaneously opens at times, was treated with foam with border at [facility]. Patient H/O CAD s/p 4 stents (6/2017), Mitral Valve Regurgitation s/p Repair (6/2017), Thalassemia, Cervical and Lumbar Spinal Stenosis (C3-C7 fusion performed 3/2013), Type 2 Diabetes, presents to [facility] ED for b/l LE weakness. [facility]. After his rehab stay, the patient went to his home, where he found it very difficult to consistently get around his house without feeling like his left leg and hip were going to "give out from under me." The patient also feels his right side is weak, but not nearly as much as his left side. He denies loss of energy or any pain. He denies bowel/bladder incontinence. Last night he had a fall leaving him on the floor for sometime, and because of that he felt he cannot live alone in his home anymore, and he came to the ER for workup of his lower extremities. Followed by medicine team.

## 2018-01-11 NOTE — CONSULT NOTE ADULT - SUBJECTIVE AND OBJECTIVE BOX
Patient is a 67y Male who presents c/o progressive worsening LBP. Pt enodrses LUE weakness from prior back surgery at [facility]. Pt endorses LLE weakness and difficulty ambulation. Pt endorses LBP is exacerbated by walking. Pt Denies HS/LOC. Denies pain/injury elsewhere. Denies numbness/tingling/paresthesias/weakness. Denies bowel/bladder incontinence. Denies fevers/chills. No other complaints at this time.    HEALTH ISSUES - PROBLEM Dx:  Prophylactic measure: Prophylactic measure  BPH (benign prostatic hyperplasia): BPH (benign prostatic hyperplasia)  Acquired hypothyroidism: Acquired hypothyroidism  Type 2 diabetes mellitus without complication, without long-term current use of insulin: Type 2 diabetes mellitus without complication, without long-term current use of insulin  HTN (hypertension): HTN (hypertension)  Coronary artery disease involving native coronary artery of native heart without angina pectoris: Coronary artery disease involving native coronary artery of native heart without angina pectoris  Spinal stenosis of lumbar region: Spinal stenosis of lumbar region          MEDICATIONS  (STANDING):  ascorbic acid 500 milliGRAM(s) Oral daily  aspirin enteric coated 81 milliGRAM(s) Oral daily  clopidogrel Tablet 75 milliGRAM(s) Oral daily  dexamethasone  IVPB 10 milliGRAM(s) IV Intermittent once  ferrous    sulfate 325 milliGRAM(s) Oral daily  gabapentin 300 milliGRAM(s) Oral every 8 hours  heparin  Injectable 5000 Unit(s) SubCutaneous every 8 hours  hydrochlorothiazide 12.5 milliGRAM(s) Oral daily  influenza   Vaccine 0.5 milliLiter(s) IntraMuscular once  levothyroxine 112 MICROGram(s) Oral daily  losartan 25 milliGRAM(s) Oral daily  metoprolol succinate ER 50 milliGRAM(s) Oral daily  senna 2 Tablet(s) Oral at bedtime  simvastatin 40 milliGRAM(s) Oral at bedtime  tamsulosin 0.4 milliGRAM(s) Oral at bedtime  traMADol 50 milliGRAM(s) Oral daily      Allergies    contrast media (iodine-based) ([place])  Raw green pepper (Other)    Intolerances        PAST MEDICAL & SURGICAL HISTORY:  Coronary artery disease involving native coronary artery of native heart without angina pectoris  Acquired hypothyroidism  Brain lesion  Heart murmur  Peripheral neuropathy  Anxiety  Spinal stenosis  HTN (hypertension)  Hyperlipidemia  DM (diabetes mellitus), type 2  S/P laminectomy with spinal fusion  H/O mitral valve repair  S/P tonsillectomy  Hx of appendectomy  H/O skin graft                            12.5   10.14 )-----------( 170      ( 2018 10:15 )             41.3       2018 10:15    140    |  98     |  14     ----------------------------<  146    3.7     |  31     |  0.57     Ca    9.0        2018 10:15  Phos  3.2       2018 10:15  Mg     1.5       2018 10:15    TPro  6.9    /  Alb  4.0    /  TBili  1.2    /  DBili  x      /  AST  19     /  ALT  13     /  AlkPhos  59     10 Amilcar 2018 11:10          Urinalysis Basic - ( 10 Amilcar 2018 11:10 )    Color: YELLOW / Appearance: CLEAR / S.024 / pH: 6.0  Gluc: NEGATIVE / Ketone: NEGATIVE  / Bili: NEGATIVE / Urobili: NORMAL mg/dL   Blood: NEGATIVE / Protein: 30 mg/dL / Nitrite: NEGATIVE   Leuk Esterase: NEGATIVE / RBC: 0-2 / WBC 0-2   Sq Epi: x / Non Sq Epi: x / Bacteria: FEW        Vital Signs Last 24 Hrs  T(C): 37.4 (18 @ 14:53), Max: 37.4 (18 @ 14:53)  T(F): 99.4 (18 @ 14:53), Max: 99.4 (18 @ 14:53)  HR: 75 (18 @ 14:53) (71 - 82)  BP: 158/94 (18 @ 14:53) (149/94 - 159/98)  BP(mean): --  RR: 18 (18 @ 14:53) (18 - 19)  SpO2: 95% (18 @ 14:53) (95% - 98%)    Gen: NAD    Spine PE:  Skin intact  No gross deformity  noted wrist drop on LUE  No midnline TTP C/T/L/S spine  No bony step offs  No paraspinal muscle ttp/hypertonicity   Negative Straight leg raise  Negative clonus  Negative babinski  Negative gregorio  + rectal tone  No saddle anesthesia    Motor:                   C5                C6              C7               C8           T1   R            5/5                4/5            5/5             5/5          5/5  L             3+/5               3+/5         3/5             4/5          4/5                L2             L3             L4               L5            S1  R         5/5           5/5          5/5             5/5           5/5  L          5/5          5/5           5/5             5/5           5/5    Sensory:            C5         C6         C7      C8       T1        (0=absent, 1=impaired, 2=normal, NT=not testable)  R         2            2           2        2         2  L          2            2           2        2         2               L2          L3         L4      L5       S1         (0=absent, 1=impaired, 2=normal, NT=not testable)  R         2            2            2        2        2  L          2            2           2        2         2    INTERPRETATION:  History: Lower extremity weakness. History of spinal   stenosis.    Description: A noncontrast MRI of the lumbar spine was performed.    Comparison is made to lumbar spine x-ray series from 01/10/2018.    Sagittal T1/T2/STIR, coronal T1, and axial T2/T1 weighted series were   performed.    There is no evidence for acute vertebral body compression fracture or   subluxation. There is no lower thoracic spinal cord compression.    Partial disc desiccation changes, facet hypertrophy, and ligamentum   flavum hypertrophy are noted.    T12-L1: A small central disc herniation is present with mild central   canal stenosis. The neural foramina are patent.    L1-L2: No significant central canal stenosis or neural foraminal   narrowing is present.    L2-L3: A minimal disc bulge, facet hypertrophy, and ligamentum flavum   hypertrophy result in mild central canal stenosis and mild bilateral   neural foraminal narrowing.    L3-L4: A broad disc bulge, facet hypertrophy, and ligamentum flavum   hypertrophy result in severe central spinal canal stenosis and moderate   bilateral neural foraminal narrowing.    L4-L5: A broad disc bulge, facet hypertrophy, and ligamentum flavum   hypertrophy result in severe central spinal canal stenosis and high-grade   bilateral neural foraminal narrowing.    L5-S1: A broad disc bulge, facet hypertrophy, and ligamentum flavum   hypertrophy noted mild central canal stenosis and moderate bilateral   foraminal narrowing.    IMPRESSION:    Severe central spinal canal stenosis is present at the L3-L4 and L4-L5   levels secondary to degenerative changes as described.    A/P: 67y Male with progressive LE weakness and difficulty with ambulation  Hold Plavix/Aspirin  Needs Cardiac clearance   Needs medical clearance   Pt needs surgical intervention possible on this admission   Pain control  FU Labs/imaging  SCDs  will follow up

## 2018-01-12 LAB
BUN SERPL-MCNC: 17 MG/DL — SIGNIFICANT CHANGE UP (ref 7–23)
CALCIUM SERPL-MCNC: 9.2 MG/DL — SIGNIFICANT CHANGE UP (ref 8.4–10.5)
CHLORIDE SERPL-SCNC: 100 MMOL/L — SIGNIFICANT CHANGE UP (ref 98–107)
CO2 SERPL-SCNC: 31 MMOL/L — SIGNIFICANT CHANGE UP (ref 22–31)
CREAT SERPL-MCNC: 0.57 MG/DL — SIGNIFICANT CHANGE UP (ref 0.5–1.3)
GLUCOSE BLDC GLUCOMTR-MCNC: 100 MG/DL — HIGH (ref 70–99)
GLUCOSE BLDC GLUCOMTR-MCNC: 107 MG/DL — HIGH (ref 70–99)
GLUCOSE BLDC GLUCOMTR-MCNC: 137 MG/DL — HIGH (ref 70–99)
GLUCOSE BLDC GLUCOMTR-MCNC: 167 MG/DL — HIGH (ref 70–99)
GLUCOSE SERPL-MCNC: 132 MG/DL — HIGH (ref 70–99)
POTASSIUM SERPL-MCNC: 4.2 MMOL/L — SIGNIFICANT CHANGE UP (ref 3.5–5.3)
POTASSIUM SERPL-SCNC: 4.2 MMOL/L — SIGNIFICANT CHANGE UP (ref 3.5–5.3)
SODIUM SERPL-SCNC: 141 MMOL/L — SIGNIFICANT CHANGE UP (ref 135–145)

## 2018-01-12 PROCEDURE — 93010 ELECTROCARDIOGRAM REPORT: CPT

## 2018-01-12 RX ORDER — ASPIRIN/CALCIUM CARB/MAGNESIUM 324 MG
81 TABLET ORAL DAILY
Qty: 0 | Refills: 0 | Status: DISCONTINUED | OUTPATIENT
Start: 2018-01-12 | End: 2018-01-19

## 2018-01-12 RX ORDER — ACETAMINOPHEN 500 MG
650 TABLET ORAL EVERY 6 HOURS
Qty: 0 | Refills: 0 | Status: DISCONTINUED | OUTPATIENT
Start: 2018-01-12 | End: 2018-01-19

## 2018-01-12 RX ADMIN — Medication 650 MILLIGRAM(S): at 23:49

## 2018-01-12 RX ADMIN — HEPARIN SODIUM 5000 UNIT(S): 5000 INJECTION INTRAVENOUS; SUBCUTANEOUS at 06:59

## 2018-01-12 RX ADMIN — Medication 650 MILLIGRAM(S): at 16:40

## 2018-01-12 RX ADMIN — SIMVASTATIN 40 MILLIGRAM(S): 20 TABLET, FILM COATED ORAL at 23:56

## 2018-01-12 RX ADMIN — SENNA PLUS 2 TABLET(S): 8.6 TABLET ORAL at 23:55

## 2018-01-12 RX ADMIN — Medication 650 MILLIGRAM(S): at 17:40

## 2018-01-12 RX ADMIN — TAMSULOSIN HYDROCHLORIDE 0.4 MILLIGRAM(S): 0.4 CAPSULE ORAL at 23:55

## 2018-01-12 RX ADMIN — LOSARTAN POTASSIUM 50 MILLIGRAM(S): 100 TABLET, FILM COATED ORAL at 06:58

## 2018-01-12 RX ADMIN — Medication 325 MILLIGRAM(S): at 12:34

## 2018-01-12 RX ADMIN — Medication 50 MILLIGRAM(S): at 06:58

## 2018-01-12 RX ADMIN — TRAMADOL HYDROCHLORIDE 50 MILLIGRAM(S): 50 TABLET ORAL at 11:00

## 2018-01-12 RX ADMIN — Medication 112 MICROGRAM(S): at 06:59

## 2018-01-12 RX ADMIN — TRAMADOL HYDROCHLORIDE 50 MILLIGRAM(S): 50 TABLET ORAL at 10:01

## 2018-01-12 RX ADMIN — GABAPENTIN 300 MILLIGRAM(S): 400 CAPSULE ORAL at 14:06

## 2018-01-12 RX ADMIN — GABAPENTIN 300 MILLIGRAM(S): 400 CAPSULE ORAL at 23:56

## 2018-01-12 RX ADMIN — GABAPENTIN 300 MILLIGRAM(S): 400 CAPSULE ORAL at 06:59

## 2018-01-12 RX ADMIN — Medication 25 MILLIGRAM(S): at 06:58

## 2018-01-12 RX ADMIN — Medication 500 MILLIGRAM(S): at 12:34

## 2018-01-12 NOTE — PHYSICAL THERAPY INITIAL EVALUATION ADULT - PERTINENT HX OF CURRENT PROBLEM, REHAB EVAL
Patient is a 69 year old male admitted to [facility] on 1/10 with bilateral LE weakness. PMH includes: CAD s/p 4 stents (6/2017), Mitral Valve Regurgitation s/p Repair (6/2017), Thalassemia, Cervical and Lumbar Spinal Stenosis (C3-C7 fusion performed 3/2013), Type 2 Diabetes. MRI of L/S showed spinal stenosis.

## 2018-01-12 NOTE — PROGRESS NOTE ADULT - SUBJECTIVE AND OBJECTIVE BOX
Patient is a 67y old  Male who presents with a chief complaint of couldn't walk or stand, weakness. (10 Amilcar 2018 17:14)  pt seen and examined at bedside   SUBJECTIVE/OVERNIGHT events noted, feels ok, pain persists    Vital Signs Last 24 Hrs  T(C): 36.7 (12 Jan 2018 13:53), Max: 36.7 (12 Jan 2018 13:53)  T(F): 98 (12 Jan 2018 13:53), Max: 98 (12 Jan 2018 13:53)  HR: 74 (12 Jan 2018 13:53) (62 - 75)  BP: 128/76 (12 Jan 2018 13:53) (128/76 - 164/99)  BP(mean): --  RR: 18 (12 Jan 2018 13:53) (18 - 18)  SpO2: 97% (12 Jan 2018 13:53) (95% - 100%)  CAPILLARY BLOOD GLUCOSE      POCT Blood Glucose.: 167 mg/dL (12 Jan 2018 12:32)  POCT Blood Glucose.: 107 mg/dL (12 Jan 2018 09:15)  POCT Blood Glucose.: 156 mg/dL (11 Jan 2018 22:17)  POCT Blood Glucose.: 107 mg/dL (11 Jan 2018 17:56)    MEDICATIONS  (STANDING):  ascorbic acid 500 milliGRAM(s) Oral daily  ferrous    sulfate 325 milliGRAM(s) Oral daily  gabapentin 300 milliGRAM(s) Oral every 8 hours  heparin  Injectable 5000 Unit(s) SubCutaneous every 8 hours  hydrochlorothiazide 25 milliGRAM(s) Oral daily  influenza   Vaccine 0.5 milliLiter(s) IntraMuscular once  levothyroxine 112 MICROGram(s) Oral daily  losartan 50 milliGRAM(s) Oral daily  metoprolol succinate ER 50 milliGRAM(s) Oral daily  senna 2 Tablet(s) Oral at bedtime  simvastatin 40 milliGRAM(s) Oral at bedtime  tamsulosin 0.4 milliGRAM(s) Oral at bedtime  traMADol 50 milliGRAM(s) Oral daily    MEDICATIONS  (PRN):  acetaminophen   Tablet. 650 milliGRAM(s) Oral every 6 hours PRN Mild Pain -and moderate pain    PHYSICAL EXAM  General : comfortable, not in any acute distress  Neck : supple, no LAD, no JVD  Eyes : EOMI, PERRLA, conjunctiva : no icterus, no pallor  MM moist, no pharyngeal erythema/exudates  Pulmonary: clear to auscultation bilateral lung fields, no crackles/rhonchi/wheezing,   CVS : S1 S2,rate normal-,rhythm-regular, no murmurs, no abnormal sounds  Gastrointestinal: soft, non tender, non distended, no organomegaly , bowel sounds audible  Extremities: no edema  Neuro: AAOx3, no focal deficits  Musculoskeletal:  FROM of all ext  Skin: no rash  LABS                        12.5   10.14 )-----------( 170      ( 11 Jan 2018 10:15 )             41.3     01-12    141  |  100  |  17  ----------------------------<  132<H>  4.2   |  31  |  0.57    Ca    9.2      12 Jan 2018 06:19  Phos  3.2     01-11  Mg     1.5     01-11        Culture - Urine (collected 10 Amilcar 2018 11:37)  Source: URINE MIDSTREAM  Final Report (11 Jan 2018 10:18):    NO GROWTH AT 24 HOURS      RADIOLOGY and IMAGING reviewed: yes  Consultant notes reviewed : yes  Care discussed with Consultants/other providers :

## 2018-01-12 NOTE — PHYSICAL THERAPY INITIAL EVALUATION ADULT - PATIENT PROFILE REVIEW, REHAB EVAL
PT orders received-->ambulate as tolerated. Consult with RE RODRIGEZ-->pt OK to participate in PT evaluation./yes

## 2018-01-12 NOTE — PHYSICAL THERAPY INITIAL EVALUATION ADULT - IMPAIRMENTS CONTRIBUTING TO GAIT DEVIATIONS, PT EVAL
decreased strength/impaired postural control/RE RODRIGEZ aware of pain in low back/impaired balance/pain

## 2018-01-12 NOTE — PHYSICAL THERAPY INITIAL EVALUATION ADULT - GAIT DISTANCE, PT EVAL
6 lateral steps-->further ambulation deferred due to Right LE buckling and pt requiring use of bedpan.

## 2018-01-12 NOTE — PHYSICAL THERAPY INITIAL EVALUATION ADULT - ADDITIONAL COMMENTS
Patient reports he was recently at [facility] for ~2 months, but prior to rehab lived alone in a private house with 1 flight to negotiate. Patient reports he was previously independent in all ADLs and used a rolling walker and single axis cane for ambulation.    Patient was left sitting on bed pan, all lines/tubes intact and call cole within reach, RE Hinkle present at bedside.

## 2018-01-12 NOTE — CONSULT NOTE ADULT - ATTENDING COMMENTS
Pt seen and examined and I agree with above PA note. Pt known to me, seen in my office previously. Now admitted with BL LE radic and difficulty ambulating. Pt has fallen. On exam, 4+/5 B TA/EHL. MRI with severe L3-5 stenosis. We discussed the treatment options for him including meds, PT, alternative therapies, JULITA, and surgery. He has failed multiple nonoperative modalities. He will speak with his wife and will consider proceeding with surgery if medically feasible. If not clearable at this time, pt can f/u as outpt for scheduling. Remain available.     Odin Trejo MD  Spinal Surgery  [facility], [address]  [place] [address]  P [phone]
Agree with above.   -Check TTE  -Given recent pci within one year, recommend at least 1 antiplt agent if no contraindications  -obtain old cath report - if patient had drug eluting stents, would ideally recommend dapt if no contraindications  -f/u with ortho spine regarding urgency of procedure and whether procedure can be performed on antiplt therapy  -further workup pending above    Darrick Melgoza MD  [facility]  [address] [place] [address], [id number]  [place] [address]  office: [phone]  pager: [phone]

## 2018-01-12 NOTE — PHYSICAL THERAPY INITIAL EVALUATION ADULT - GAIT DEVIATIONS NOTED, PT EVAL
increased time in double stance/decreased step length/decreased stride length/decreased lyudmila/decreased velocity of limb motion

## 2018-01-12 NOTE — CONSULT NOTE ADULT - SUBJECTIVE AND OBJECTIVE BOX
HISTORY OF PRESENT ILLNESS:  This is a very pleasant 67 year old gentleman with HTN HLD DM spinal stenosis  CAD s/p 2 stents in June 2017 as well as MV ring repair June 2017 at [facility] who follows with Dr Kulwinder Randall for cardiology who recently was admitted with profound epistaxis during which time he held both his ASA and Plavix (x 10 days) now admitted from home after 2 month stay at [facility] for BL LE weakness now pending possible orthopedic surgery for spinal stenosis. He denies any anginal symptoms, syncope near syncope or palpitations.     PAST MEDICAL & SURGICAL HISTORY:  Coronary artery disease involving native coronary artery of native heart without angina pectoris  Acquired hypothyroidism  Brain lesion  Heart murmur  Peripheral neuropathy  Anxiety  Spinal stenosis  HTN (hypertension)  Hyperlipidemia  DM (diabetes mellitus), type 2  S/P laminectomy with spinal fusion  H/O mitral valve repair  S/P tonsillectomy  Hx of appendectomy  H/O skin graft    	    MEDICATIONS:  heparin  Injectable 5000 Unit(s) SubCutaneous every 8 hours  hydrochlorothiazide 25 milliGRAM(s) Oral daily  losartan 50 milliGRAM(s) Oral daily  metoprolol succinate ER 50 milliGRAM(s) Oral daily  tamsulosin 0.4 milliGRAM(s) Oral at bedtime  acetaminophen   Tablet. 650 milliGRAM(s) Oral every 6 hours PRN  gabapentin 300 milliGRAM(s) Oral every 8 hours  traMADol 50 milliGRAM(s) Oral daily  senna 2 Tablet(s) Oral at bedtime  levothyroxine 112 MICROGram(s) Oral daily  simvastatin 40 milliGRAM(s) Oral at bedtime  ascorbic acid 500 milliGRAM(s) Oral daily  ferrous    sulfate 325 milliGRAM(s) Oral daily  influenza   Vaccine 0.5 milliLiter(s) IntraMuscular once      Allergies  contrast media (iodine-based) (Flushing)  Raw green pepper (Other)      FAMILY HISTORY:  Family history of other heart disease (Father)      SOCIAL HISTORY:    [ +] Non-smoker  [ ] Smoker  [ -] Alcohol      REVIEW OF SYSTEMS:      BL LE weakness  otherwise negative       PHYSICAL EXAM:  T(C): 36.7 (01-12-18 @ 13:53), Max: 36.7 (01-12-18 @ 13:53)  HR: 74 (01-12-18 @ 13:53) (62 - 75)  BP: 128/76 (01-12-18 @ 13:53) (128/76 - 164/99)  RR: 18 (01-12-18 @ 13:53) (18 - 18)  SpO2: 97% (01-12-18 @ 13:53) (95% - 100%)  Wt(kg): --  I&O's Summary      NCAT   S1S2 RRR  CTABL  SOFT NT ND  WARM DRY NO EDEMA  ALERT    TELEMETRY: n/a   	    ECG:  	NSR no ischemia    RADIOLOGY:  < from: MR Lumbar Spine No Cont (01.11.18 @ 06:32) >  IMPRESSION:    Severe central spinal canal stenosis is present at the L3-L4 and L4-L5   levels secondary to degenerative changes as described.    OTHER: 	  	  LABS:	 	                         12.5   10.14 )-----------( 170      ( 11 Jan 2018 10:15 )             41.3       01-12    141  |  100  |  17  ----------------------------<  132<H>  4.2   |  31  |  0.57    Ca    9.2      12 Jan 2018 06:19  Phos  3.2     01-11  Mg     1.5     01-11        ASSESSMENT/PLAN:  This is a very pleasant 67 year old gentleman with HTN HLD DM spinal stenosis  CAD s/p 2 stents in June 2017 as well as MV ring repair June 2017 with possible post op TIA  at [facility] who follows with Dr Kulwinder Randall for cardiology who recently was admitted with profound epistaxis during which time he held both his ASA and Plavix (x 10 days).  He has been tolerated DAPT now for about a month without issue. He is now admitted from home after 2 month stay at [facility] for BL LE weakness now pending possible orthopedic surgery for spinal stenosis. He denies any anginal symptoms, syncope near syncope or palpitations.     -I have d/w the patient's cardiologist Dr Randall - unfortunately as he is not in the office, he was unable to tell me if his stents are BMS or SONIA. Regardless, he agrees  the patient should be maintained on at least ASA 81mg   - I have d/w Dr Hoskins and will reorder ASA 81mg   - will d/w orthopedic surgery re: imminence of surgery however given his recent cardiac surgery would obtain repeat TTE to further risk stratify  - his RCRI score is 2 (TIA, CAD) making him at least moderate risk for intermediate risk procedure   - c/w ASA/statin/bb  - HD stable with no evidence of CHF  - final recs pending TTE    Nahomi Morales [facility]  O:  [phone]  P: [phone] HISTORY OF PRESENT ILLNESS:  This is a very pleasant 67 year old gentleman with HTN HLD DM spinal stenosis  CAD s/p 2 stents in June 2017 as well as MV ring repair June 2017 at [facility] who follows with Dr Kulwinder Randall for cardiology who recently was admitted with profound epistaxis during which time he held both his ASA and Plavix (x 10 days) now admitted from home after 2 month stay at [facility] for BL LE weakness now pending possible orthopedic surgery for spinal stenosis. He denies any anginal symptoms, syncope near syncope or palpitations.     PAST MEDICAL & SURGICAL HISTORY:  Coronary artery disease involving native coronary artery of native heart without angina pectoris  Acquired hypothyroidism  Brain lesion  Heart murmur  Peripheral neuropathy  Anxiety  Spinal stenosis  HTN (hypertension)  Hyperlipidemia  DM (diabetes mellitus), type 2  S/P laminectomy with spinal fusion  H/O mitral valve repair  S/P tonsillectomy  Hx of appendectomy  H/O skin graft    	    MEDICATIONS:  heparin  Injectable 5000 Unit(s) SubCutaneous every 8 hours  hydrochlorothiazide 25 milliGRAM(s) Oral daily  losartan 50 milliGRAM(s) Oral daily  metoprolol succinate ER 50 milliGRAM(s) Oral daily  tamsulosin 0.4 milliGRAM(s) Oral at bedtime  acetaminophen   Tablet. 650 milliGRAM(s) Oral every 6 hours PRN  gabapentin 300 milliGRAM(s) Oral every 8 hours  traMADol 50 milliGRAM(s) Oral daily  senna 2 Tablet(s) Oral at bedtime  levothyroxine 112 MICROGram(s) Oral daily  simvastatin 40 milliGRAM(s) Oral at bedtime  ascorbic acid 500 milliGRAM(s) Oral daily  ferrous    sulfate 325 milliGRAM(s) Oral daily  influenza   Vaccine 0.5 milliLiter(s) IntraMuscular once      Allergies  contrast media (iodine-based) (Flushing)  Raw green pepper (Other)      FAMILY HISTORY:  Family history of other heart disease (Father)      SOCIAL HISTORY:    [ +] Non-smoker  [ ] Smoker  [ -] Alcohol      REVIEW OF SYSTEMS:      BL LE weakness  otherwise negative       PHYSICAL EXAM:  T(C): 36.7 (01-12-18 @ 13:53), Max: 36.7 (01-12-18 @ 13:53)  HR: 74 (01-12-18 @ 13:53) (62 - 75)  BP: 128/76 (01-12-18 @ 13:53) (128/76 - 164/99)  RR: 18 (01-12-18 @ 13:53) (18 - 18)  SpO2: 97% (01-12-18 @ 13:53) (95% - 100%)  Wt(kg): --  I&O's Summary      NCAT   S1S2 RRR  CTABL  SOFT NT ND  WARM DRY NO EDEMA  ALERT    TELEMETRY: n/a   	    ECG:  current EKG PENDING   11/17:	NSR no ischemia    RADIOLOGY:  < from: MR Lumbar Spine No Cont (01.11.18 @ 06:32) >  IMPRESSION:    Severe central spinal canal stenosis is present at the L3-L4 and L4-L5   levels secondary to degenerative changes as described.    OTHER: 	  	  LABS:	 	                         12.5   10.14 )-----------( 170      ( 11 Jan 2018 10:15 )             41.3       01-12    141  |  100  |  17  ----------------------------<  132<H>  4.2   |  31  |  0.57    Ca    9.2      12 Jan 2018 06:19  Phos  3.2     01-11  Mg     1.5     01-11        ASSESSMENT/PLAN:  This is a very pleasant 67 year old gentleman with HTN HLD DM spinal stenosis  CAD s/p 2 stents in June 2017 as well as MV ring repair June 2017 with possible post op TIA  at [facility] who follows with Dr Kulwinder Randall for cardiology who recently was admitted with profound epistaxis during which time he held both his ASA and Plavix (x 10 days).  He has been tolerated DAPT now for about a month without issue. He is now admitted from home after 2 month stay at [facility] for BL LE weakness now pending possible orthopedic surgery for spinal stenosis. He denies any anginal symptoms, syncope near syncope or palpitations.     -I have d/w the patient's cardiologist Dr Randall - unfortunately as he is not in the office, he was unable to tell me if his stents are BMS or SONIA. Regardless, he agrees  the patient should be maintained on at least ASA 81mg   - I have d/w Dr Hoskins and will reorder ASA 81mg   - will d/w orthopedic surgery re: imminence of surgery however given his recent cardiac surgery would obtain repeat TTE to further risk stratify  - his RCRI score is 2 (TIA, CAD) making him at least moderate risk for intermediate risk procedure   - c/w ASA/statin/bb  - HD stable with no evidence of CHF  - final recs pending TTE and repeat EKG  - upon dc f/u with Dr Randall for cardio [phone]    Nahomi Morales [facility]  O:  [phone]  P: [phone]

## 2018-01-12 NOTE — DIETITIAN INITIAL EVALUATION ADULT. - OTHER INFO
Received nutrition consult for Assessment. Reports good appetite and po intake and denies nausea/vomiting/diarrhea/constipation or any issues with chewing/swallowing. Pt was taking protein supplement and Glucerna prior to admission and while at rehab. Current po intakes reported to be  good with % meals completed. Suggested inform Nursing if po intakes decrease and may consider Supplements as needed. Nutrition in wound healing discussed.

## 2018-01-12 NOTE — PHYSICAL THERAPY INITIAL EVALUATION ADULT - IMPAIRED TRANSFERS: SIT/STAND, REHAB EVAL
pain/decreased strength/RN Katina RODRIGEZ aware of pain in low back/impaired balance/impaired postural control

## 2018-01-13 LAB
GLUCOSE BLDC GLUCOMTR-MCNC: 109 MG/DL — HIGH (ref 70–99)
GLUCOSE BLDC GLUCOMTR-MCNC: 141 MG/DL — HIGH (ref 70–99)
GLUCOSE BLDC GLUCOMTR-MCNC: 83 MG/DL — SIGNIFICANT CHANGE UP (ref 70–99)
GLUCOSE BLDC GLUCOMTR-MCNC: 85 MG/DL — SIGNIFICANT CHANGE UP (ref 70–99)

## 2018-01-13 RX ADMIN — LOSARTAN POTASSIUM 50 MILLIGRAM(S): 100 TABLET, FILM COATED ORAL at 06:13

## 2018-01-13 RX ADMIN — SENNA PLUS 2 TABLET(S): 8.6 TABLET ORAL at 22:26

## 2018-01-13 RX ADMIN — TRAMADOL HYDROCHLORIDE 50 MILLIGRAM(S): 50 TABLET ORAL at 11:45

## 2018-01-13 RX ADMIN — Medication 650 MILLIGRAM(S): at 06:12

## 2018-01-13 RX ADMIN — TRAMADOL HYDROCHLORIDE 50 MILLIGRAM(S): 50 TABLET ORAL at 11:44

## 2018-01-13 RX ADMIN — Medication 5 MILLIGRAM(S): at 22:26

## 2018-01-13 RX ADMIN — Medication 650 MILLIGRAM(S): at 07:12

## 2018-01-13 RX ADMIN — GABAPENTIN 300 MILLIGRAM(S): 400 CAPSULE ORAL at 15:55

## 2018-01-13 RX ADMIN — Medication 112 MICROGRAM(S): at 06:13

## 2018-01-13 RX ADMIN — GABAPENTIN 300 MILLIGRAM(S): 400 CAPSULE ORAL at 22:26

## 2018-01-13 RX ADMIN — Medication 650 MILLIGRAM(S): at 00:49

## 2018-01-13 RX ADMIN — TAMSULOSIN HYDROCHLORIDE 0.4 MILLIGRAM(S): 0.4 CAPSULE ORAL at 22:26

## 2018-01-13 RX ADMIN — Medication 25 MILLIGRAM(S): at 06:13

## 2018-01-13 RX ADMIN — Medication 325 MILLIGRAM(S): at 11:44

## 2018-01-13 RX ADMIN — GABAPENTIN 300 MILLIGRAM(S): 400 CAPSULE ORAL at 06:13

## 2018-01-13 RX ADMIN — Medication 500 MILLIGRAM(S): at 11:44

## 2018-01-13 RX ADMIN — SIMVASTATIN 40 MILLIGRAM(S): 20 TABLET, FILM COATED ORAL at 22:26

## 2018-01-13 RX ADMIN — Medication 50 MILLIGRAM(S): at 06:13

## 2018-01-13 NOTE — PROGRESS NOTE ADULT - SUBJECTIVE AND OBJECTIVE BOX
Subjective:   	 no chest pain   no shortness of breath       MEDICATIONS:  MEDICATIONS  (STANDING):  ascorbic acid 500 milliGRAM(s) Oral daily  aspirin enteric coated 81 milliGRAM(s) Oral daily  ferrous    sulfate 325 milliGRAM(s) Oral daily  gabapentin 300 milliGRAM(s) Oral every 8 hours  heparin  Injectable 5000 Unit(s) SubCutaneous every 8 hours  hydrochlorothiazide 25 milliGRAM(s) Oral daily  levothyroxine 112 MICROGram(s) Oral daily  losartan 50 milliGRAM(s) Oral daily  metoprolol succinate ER 50 milliGRAM(s) Oral daily  senna 2 Tablet(s) Oral at bedtime  simvastatin 40 milliGRAM(s) Oral at bedtime  tamsulosin 0.4 milliGRAM(s) Oral at bedtime  traMADol 50 milliGRAM(s) Oral daily    MEDICATIONS  (PRN):  acetaminophen   Tablet. 650 milliGRAM(s) Oral every 6 hours PRN Mild Pain -and moderate pain      LABS:	 	    CARDIAC MARKERS:          141  |  100  |  17  ----------------------------<  132<H>  4.2   |  31  |  0.57    Ca    9.2      2018 06:19      COAGS:       proBNP:   Lipid Profile:   HgA1c:   TSH:       PHYSICAL EXAM:  T(C): 36.3 (18 @ 05:06), Max: 36.7 (18 @ 13:53)  HR: 61 (18 @ 05:06) (61 - 74)  BP: 160/85 (18 @ 05:06) (116/59 - 160/85)  RR: 18 (18 @ 05:06) (18 - 18)  SpO2: 98% (18 @ 05:06) (97% - 99%)  Wt(kg): --  I&O's Summary        	    Cardiovascular: Normal S1 S2,     No JVD, 1/6 MARY murmur, Peripheral pulses palpable 2+ bilaterally  Respiratory: Lungs clear to auscultation, normal effort 	  Gastrointestinal:  Soft, Non-tender, + BS	  Extremities no edema, cyanosis, clubbing B/L LE's       TELEMETRY: 	    EC/17:	NSR no ischemia    RADIOLOGY:   < from: MR Lumbar Spine No Cont (18 @ 06:32) >  IMPRESSION:    Severe central spinal canal stenosis is present at the L3-L4 and L4-L5   levels secondary to degenerative changes as described.      DIAGNOSTIC TESTING:  [ ] Echocardiogram:  [ ]  Catheterization:  [ ] Stress Test:    OTHER: 	      ASSESSMENT/PLAN: 	67y Male with HTN HLD DM spinal stenosis  CAD s/p 2 stents in 2017 as well as MV ring repair 2017 with possible post op TIA  at [facility] who follows with Dr Kulwinder Randall for cardiology who recently was admitted with profound epistaxis during which time he held both his ASA and Plavix (x 10 days).  He has been tolerated DAPT now for about a month without issue. He is now admitted from home after 2 month stay at [facility] for BL LE weakness now pending possible orthopedic surgery for spinal stenosis. He denies any anginal symptoms, syncope near syncope or palpitations.     -Patient's cardiologist Dr Randall called - unfortunately as he is not in the office, he was unable to tell me if his stents are BMS or SONIA. Regardless, he agrees  the patient should be maintained on at least ASA 81mg Given recent pci within one year,  -obtain old cath report - if patient had drug eluting stents, would ideally recommend dapt if no contraindications  -  d/w Dr Hoskins and  ASA 81mg reordered  -  d/w orthopedic surgery re: imminence of surgery however given his recent cardiac surgery would obtain repeat TTE to further risk stratify  - his RCRI score is 2 (TIA, CAD) making him at least moderate risk for intermediate risk procedure   - c/w ASA/statin/bb  - HD stable with no evidence of CHF  - final recs pending TTE and repeat EKG  - upon dc f/u with Dr Randall for cardio [phone] Subjective:   	 no chest pain   no shortness of breath       MEDICATIONS:  MEDICATIONS  (STANDING):  ascorbic acid 500 milliGRAM(s) Oral daily  aspirin enteric coated 81 milliGRAM(s) Oral daily  ferrous    sulfate 325 milliGRAM(s) Oral daily  gabapentin 300 milliGRAM(s) Oral every 8 hours  heparin  Injectable 5000 Unit(s) SubCutaneous every 8 hours  hydrochlorothiazide 25 milliGRAM(s) Oral daily  levothyroxine 112 MICROGram(s) Oral daily  losartan 50 milliGRAM(s) Oral daily  metoprolol succinate ER 50 milliGRAM(s) Oral daily  senna 2 Tablet(s) Oral at bedtime  simvastatin 40 milliGRAM(s) Oral at bedtime  tamsulosin 0.4 milliGRAM(s) Oral at bedtime  traMADol 50 milliGRAM(s) Oral daily    MEDICATIONS  (PRN):  acetaminophen   Tablet. 650 milliGRAM(s) Oral every 6 hours PRN Mild Pain -and moderate pain      LABS:	 	    CARDIAC MARKERS:      01-12    141  |  100  |  17  ----------------------------<  132<H>  4.2   |  31  |  0.57    Ca    9.2      12 Jan 2018 06:19      COAGS:       proBNP:   Lipid Profile:   HgA1c:   TSH:       PHYSICAL EXAM:  T(C): 36.3 (01-13-18 @ 05:06), Max: 36.7 (01-12-18 @ 13:53)  HR: 61 (01-13-18 @ 05:06) (61 - 74)  BP: 160/85 (01-13-18 @ 05:06) (116/59 - 160/85)  RR: 18 (01-13-18 @ 05:06) (18 - 18)  SpO2: 98% (01-13-18 @ 05:06) (97% - 99%)  Wt(kg): --  I&O's Summary        	    Cardiovascular: Normal S1 S2,     No JVD, 1/6 MARY murmur, Peripheral pulses palpable 2+ bilaterally  Respiratory: Lungs clear to auscultation, normal effort 	  Gastrointestinal:  Soft, Non-tender, + BS	  Extremities no edema, cyanosis, clubbing B/L LE's       TELEMETRY: 	    ECG:  	SR 1st  degree  AVB  @ 68   11/17:	NSR no ischemia    RADIOLOGY:   < from: MR Lumbar Spine No Cont (01.11.18 @ 06:32) >  IMPRESSION:    Severe central spinal canal stenosis is present at the L3-L4 and L4-L5   levels secondary to degenerative changes as described.      DIAGNOSTIC TESTING:  [ ] Echocardiogram:  [ ]  Catheterization:  [ ] Stress Test:    OTHER: 	      ASSESSMENT/PLAN: 	67y Male with HTN HLD DM spinal stenosis  CAD s/p 2 stents in June 2017 as well as MV ring repair June 2017 with possible post op TIA  at [facility] who follows with Dr Kulwinder Randall for cardiology who recently was admitted with profound epistaxis during which time he held both his ASA and Plavix (x 10 days).  He has been tolerated DAPT now for about a month without issue. He is now admitted from home after 2 month stay at [facility] for BL LE weakness now pending possible orthopedic surgery for spinal stenosis. He denies any anginal symptoms, syncope near syncope or palpitations.     -Patient's cardiologist Dr Randall called - unfortunately as he is not in the office, he was unable to tell me if his stents are BMS or SONIA. Regardless, he agrees  the patient should be maintained on at least ASA 81mg Given recent pci within one year,  -obtain old cath report - if patient had drug eluting stents, would ideally recommend dapt if no contraindications  -  d/w Dr Hoskins and  ASA 81mg reordered  -  d/w orthopedic surgery re: imminence of surgery however given his recent cardiac surgery would obtain repeat TTE to further risk stratify  - his RCRI score is 2 (TIA, CAD) making him at least moderate risk for intermediate risk procedure   - c/w ASA/statin/bb  - HD stable with no evidence of CHF  - final recs pending TTE   - upon dc f/u with Dr Randall for cardio [phone] Subjective:   	 no chest pain   no shortness of breath       MEDICATIONS:  MEDICATIONS  (STANDING):  ascorbic acid 500 milliGRAM(s) Oral daily  aspirin enteric coated 81 milliGRAM(s) Oral daily  ferrous    sulfate 325 milliGRAM(s) Oral daily  gabapentin 300 milliGRAM(s) Oral every 8 hours  heparin  Injectable 5000 Unit(s) SubCutaneous every 8 hours  hydrochlorothiazide 25 milliGRAM(s) Oral daily  levothyroxine 112 MICROGram(s) Oral daily  losartan 50 milliGRAM(s) Oral daily  metoprolol succinate ER 50 milliGRAM(s) Oral daily  senna 2 Tablet(s) Oral at bedtime  simvastatin 40 milliGRAM(s) Oral at bedtime  tamsulosin 0.4 milliGRAM(s) Oral at bedtime  traMADol 50 milliGRAM(s) Oral daily    MEDICATIONS  (PRN):  acetaminophen   Tablet. 650 milliGRAM(s) Oral every 6 hours PRN Mild Pain -and moderate pain      LABS:	 	    CARDIAC MARKERS:      01-12    141  |  100  |  17  ----------------------------<  132<H>  4.2   |  31  |  0.57    Ca    9.2      12 Jan 2018 06:19      COAGS:       proBNP:   Lipid Profile:   HgA1c:   TSH:       PHYSICAL EXAM:  T(C): 36.3 (01-13-18 @ 05:06), Max: 36.7 (01-12-18 @ 13:53)  HR: 61 (01-13-18 @ 05:06) (61 - 74)  BP: 160/85 (01-13-18 @ 05:06) (116/59 - 160/85)  RR: 18 (01-13-18 @ 05:06) (18 - 18)  SpO2: 98% (01-13-18 @ 05:06) (97% - 99%)  Wt(kg): --  I&O's Summary        	    Cardiovascular: Normal S1 S2,     No JVD, 1/6 MARY murmur, Peripheral pulses palpable 2+ bilaterally  Respiratory: Lungs clear to auscultation, normal effort 	  Gastrointestinal:  Soft, Non-tender, + BS	  Extremities no edema, cyanosis, clubbing B/L LE's       TELEMETRY: 	    ECG:  	SR 1st  degree  AVB  @ 68   11/17:	NSR no ischemia    RADIOLOGY:   < from: MR Lumbar Spine No Cont (01.11.18 @ 06:32) >  IMPRESSION:    Severe central spinal canal stenosis is present at the L3-L4 and L4-L5   levels secondary to degenerative changes as described.      DIAGNOSTIC TESTING:  [ ] Echocardiogram:  [ ]  Catheterization:  [ ] Stress Test:    OTHER: 	      ASSESSMENT/PLAN: 	67y Male with HTN HLD DM spinal stenosis  CAD s/p 2 stents in June 2017 as well as MV repair June 2017 with possible post op TIA  at [facility] who follows with Dr Kulwinder Randall for cardiology who recently was admitted with profound epistaxis during which time he held both his ASA and Plavix (x 10 days).  He has been tolerated DAPT now for about a month without issue. He is now admitted from home after 2 month stay at [facility] for BL LE weakness now pending possible orthopedic surgery for spinal stenosis. He denies any anginal symptoms, syncope near syncope or palpitations.     -Patient's cardiologist Dr Randall called - unfortunately as he is not in the office, he was unable to tell me if his stents are BMS or SONIA. Regardless, he agrees  the patient should be maintained on at least ASA 81mg Given recent pci within one year,  -obtain old cath report - if patient had drug eluting stents, would ideally recommend dapt if no contraindications  -  d/w Dr Hoskins and  ASA 81mg reordered  -  d/w orthopedic surgery re: imminence of surgery however given his recent cardiac surgery would obtain repeat TTE to further risk stratify  - his RCRI score is 2 (TIA, CAD) making him at least moderate risk for intermediate risk procedure   - c/w ASA/statin/bb  - HD stable with no evidence of CHF  - final recs pending TTE   - upon dc f/u with Dr Randall for cardio [phone]

## 2018-01-13 NOTE — PROGRESS NOTE ADULT - SUBJECTIVE AND OBJECTIVE BOX
Patient is a 67y old  Male who presents with a chief complaint of couldn't walk or stand, weakness. (10 Amilcar 2018 17:14)  pt seen and examined at bedside   SUBJECTIVE/OVERNIGHT events noted, feels well    Vital Signs Last 24 Hrs  T(C): 36.3 (13 Jan 2018 05:06), Max: 36.7 (12 Jan 2018 20:46)  T(F): 97.4 (13 Jan 2018 05:06), Max: 98 (12 Jan 2018 20:46)  HR: 61 (13 Jan 2018 05:06) (61 - 73)  BP: 160/85 (13 Jan 2018 05:06) (116/59 - 160/85)  BP(mean): --  RR: 18 (13 Jan 2018 05:06) (18 - 18)  SpO2: 98% (13 Jan 2018 05:06) (98% - 99%)  CAPILLARY BLOOD GLUCOSE      POCT Blood Glucose.: 83 mg/dL (13 Jan 2018 17:56)  POCT Blood Glucose.: 141 mg/dL (13 Jan 2018 13:03)  POCT Blood Glucose.: 85 mg/dL (13 Jan 2018 08:49)  POCT Blood Glucose.: 137 mg/dL (12 Jan 2018 22:29)    MEDICATIONS  (STANDING):  ascorbic acid 500 milliGRAM(s) Oral daily  aspirin enteric coated 81 milliGRAM(s) Oral daily  ferrous    sulfate 325 milliGRAM(s) Oral daily  gabapentin 300 milliGRAM(s) Oral every 8 hours  heparin  Injectable 5000 Unit(s) SubCutaneous every 8 hours  hydrochlorothiazide 25 milliGRAM(s) Oral daily  levothyroxine 112 MICROGram(s) Oral daily  losartan 50 milliGRAM(s) Oral daily  metoprolol succinate ER 50 milliGRAM(s) Oral daily  senna 2 Tablet(s) Oral at bedtime  simvastatin 40 milliGRAM(s) Oral at bedtime  tamsulosin 0.4 milliGRAM(s) Oral at bedtime  traMADol 50 milliGRAM(s) Oral daily    MEDICATIONS  (PRN):  acetaminophen   Tablet. 650 milliGRAM(s) Oral every 6 hours PRN Mild Pain -and moderate pain    PHYSICAL EXAM  General : comfortable, not in any acute distress  Neck : supple, no LAD, no JVD  Eyes : EOMI, PERRLA, conjunctiva : no icterus, no pallor  MM moist, no pharyngeal erythema/exudates  Pulmonary: clear to auscultation bilateral lung fields, no crackles/rhonchi/wheezing,   CVS : S1 S2,rate normal-,rhythm-regular, no murmurs, no abnormal sounds  Gastrointestinal: soft, non tender, non distended, no organomegaly , bowel sounds audible  Extremities: no edema  Neuro: AAOx3, no focal deficits  Musculoskeletal:  FROM of all ext  Skin: no rash  LABS    01-12    141  |  100  |  17  ----------------------------<  132<H>  4.2   |  31  |  0.57    Ca    9.2      12 Jan 2018 06:19        RADIOLOGY and IMAGING reviewed: yes  Consultant notes reviewed : yes  Care discussed with Consultants/other providers :

## 2018-01-13 NOTE — CHART NOTE - NSCHARTNOTEFT_GEN_A_CORE
Discussed case with Dr. Odin Trejo.  Given patient needing to be on aspirin per cardiology for the stents he would defer surgery to a later date when he will be able to maintained safely off of aspirin for 7 days prior to the OR.  Plan will be for him to get decompression as an outpatient and follow up in 's office after discharge.

## 2018-01-13 NOTE — PROGRESS NOTE ADULT - SUBJECTIVE AND OBJECTIVE BOX
Patient is a 67y Male who presents c/o progressive worsening LBP. Pt enodrses LUE weakness from prior back surgery at [place]. Pt endorses LLE weakness and difficulty ambulation. Pt endorses LBP is exacerbated by walking. Pt Denies HS/LOC. Denies pain/injury elsewhere. Denies numbness/tingling/paresthesias/weakness. Denies bowel/bladder incontinence. Denies fevers/chills. No other complaints at this time.    HEALTH ISSUES - PROBLEM Dx:  Prophylactic measure: Prophylactic measure  BPH (benign prostatic hyperplasia): BPH (benign prostatic hyperplasia)  Acquired hypothyroidism: Acquired hypothyroidism  Type 2 diabetes mellitus without complication, without long-term current use of insulin: Type 2 diabetes mellitus without complication, without long-term current use of insulin  HTN (hypertension): HTN (hypertension)  Coronary artery disease involving native coronary artery of native heart without angina pectoris: Coronary artery disease involving native coronary artery of native heart without angina pectoris  Spinal stenosis of lumbar region: Spinal stenosis of lumbar region          MEDICATIONS  (STANDING):  ascorbic acid 500 milliGRAM(s) Oral daily  aspirin enteric coated 81 milliGRAM(s) Oral daily  clopidogrel Tablet 75 milliGRAM(s) Oral daily  dexamethasone  IVPB 10 milliGRAM(s) IV Intermittent once  ferrous    sulfate 325 milliGRAM(s) Oral daily  gabapentin 300 milliGRAM(s) Oral every 8 hours  heparin  Injectable 5000 Unit(s) SubCutaneous every 8 hours  hydrochlorothiazide 12.5 milliGRAM(s) Oral daily  influenza   Vaccine 0.5 milliLiter(s) IntraMuscular once  levothyroxine 112 MICROGram(s) Oral daily  losartan 25 milliGRAM(s) Oral daily  metoprolol succinate ER 50 milliGRAM(s) Oral daily  senna 2 Tablet(s) Oral at bedtime  simvastatin 40 milliGRAM(s) Oral at bedtime  tamsulosin 0.4 milliGRAM(s) Oral at bedtime  traMADol 50 milliGRAM(s) Oral daily      Allergies    contrast media (iodine-based) (Flushing)  Raw green pepper (Other)    Intolerances        PAST MEDICAL & SURGICAL HISTORY:  Coronary artery disease involving native coronary artery of native heart without angina pectoris  Acquired hypothyroidism  Brain lesion  Heart murmur  Peripheral neuropathy  Anxiety  Spinal stenosis  HTN (hypertension)  Hyperlipidemia  DM (diabetes mellitus), type 2  S/P laminectomy with spinal fusion  H/O mitral valve repair  S/P tonsillectomy  Hx of appendectomy  H/O skin graft    ICU Vital Signs Last 24 Hrs  T(C): 36.7 (12 Jan 2018 20:46), Max: 36.7 (12 Jan 2018 13:53)  T(F): 98 (12 Jan 2018 20:46), Max: 98 (12 Jan 2018 13:53)  HR: 73 (12 Jan 2018 20:46) (62 - 74)  BP: 116/59 (12 Jan 2018 20:46) (116/59 - 148/64)  BP(mean): --  ABP: --  ABP(mean): --  RR: 18 (12 Jan 2018 20:46) (18 - 18)  SpO2: 99% (12 Jan 2018 20:46) (96% - 99%)                          12.5   10.14 )-----------( 170      ( 11 Jan 2018 10:15 )             41.3   01-12    141  |  100  |  17  ----------------------------<  132<H>  4.2   |  31  |  0.57    Ca    9.2      12 Jan 2018 06:19  Phos  3.2     01-11  Mg     1.5     01-11      Gen: NAD    Spine PE:  Skin intact  No gross deformity  noted wrist drop on LUE  No midnline TTP C/T/L/S spine  No bony step offs  No paraspinal muscle ttp/hypertonicity   Negative Straight leg raise  Negative clonus  Negative babinski  Negative gregorio  + rectal tone  No saddle anesthesia      Motor:                   C5                C6              C7               C8           T1   R            5/5                4/5            5/5             5/5          5/5  L             3+/5               3+/5         3/5             4/5          4/5                L2             L3             L4               L5            S1  R         5/5           5/5          5/5             5/5           5/5  L          5/5          5/5           5/5             5/5           5/5    Sensory:            C5         C6         C7      C8       T1        (0=absent, 1=impaired, 2=normal, NT=not testable)  R         2            2           2        2         2  L          2            2           2        2         2               L2          L3         L4      L5       S1         (0=absent, 1=impaired, 2=normal, NT=not testable)  R         2            2            2        2        2  L          2            2           2        2         2    INTERPRETATION:  History: Lower extremity weakness. History of spinal   stenosis.    Description: A noncontrast MRI of the lumbar spine was performed.    Comparison is made to lumbar spine x-ray series from 01/10/2018.    Sagittal T1/T2/STIR, coronal T1, and axial T2/T1 weighted series were   performed.    There is no evidence for acute vertebral body compression fracture or   subluxation. There is no lower thoracic spinal cord compression.    Partial disc desiccation changes, facet hypertrophy, and ligamentum   flavum hypertrophy are noted.    T12-L1: A small central disc herniation is present with mild central   canal stenosis. The neural foramina are patent.    L1-L2: No significant central canal stenosis or neural foraminal   narrowing is present.    L2-L3: A minimal disc bulge, facet hypertrophy, and ligamentum flavum   hypertrophy result in mild central canal stenosis and mild bilateral   neural foraminal narrowing.    L3-L4: A broad disc bulge, facet hypertrophy, and ligamentum flavum   hypertrophy result in severe central spinal canal stenosis and moderate   bilateral neural foraminal narrowing.    L4-L5: A broad disc bulge, facet hypertrophy, and ligamentum flavum   hypertrophy result in severe central spinal canal stenosis and high-grade   bilateral neural foraminal narrowing.    L5-S1: A broad disc bulge, facet hypertrophy, and ligamentum flavum   hypertrophy noted mild central canal stenosis and moderate bilateral   foraminal narrowing.    IMPRESSION:    Severe central spinal canal stenosis is present at the L3-L4 and L4-L5   levels secondary to degenerative changes as described.    A/P: 67y Male with progressive LE weakness and difficulty with ambulation  Needs Cardiac clearance   Needs medical clearance   Pt needs surgical intervention possible on this admission, the patient must be off aspirin for a total of 7 days before we can perform operation, would appreciate further cardiac consideration. if patient is unsafe to hold aspirin for 7 days we should proceed with non-operative management at this time until the patient is cardiac optimized for 7 days off asa.  Pain control  FU Labs/imaging  SCDs  discussed with attending

## 2018-01-14 LAB
GLUCOSE BLDC GLUCOMTR-MCNC: 101 MG/DL — HIGH (ref 70–99)
GLUCOSE BLDC GLUCOMTR-MCNC: 184 MG/DL — HIGH (ref 70–99)
GLUCOSE BLDC GLUCOMTR-MCNC: 87 MG/DL — SIGNIFICANT CHANGE UP (ref 70–99)

## 2018-01-14 RX ADMIN — TRAMADOL HYDROCHLORIDE 50 MILLIGRAM(S): 50 TABLET ORAL at 11:45

## 2018-01-14 RX ADMIN — TRAMADOL HYDROCHLORIDE 50 MILLIGRAM(S): 50 TABLET ORAL at 12:48

## 2018-01-14 RX ADMIN — GABAPENTIN 300 MILLIGRAM(S): 400 CAPSULE ORAL at 05:17

## 2018-01-14 RX ADMIN — SIMVASTATIN 40 MILLIGRAM(S): 20 TABLET, FILM COATED ORAL at 21:29

## 2018-01-14 RX ADMIN — LOSARTAN POTASSIUM 50 MILLIGRAM(S): 100 TABLET, FILM COATED ORAL at 05:17

## 2018-01-14 RX ADMIN — GABAPENTIN 300 MILLIGRAM(S): 400 CAPSULE ORAL at 14:55

## 2018-01-14 RX ADMIN — Medication 650 MILLIGRAM(S): at 05:21

## 2018-01-14 RX ADMIN — Medication 650 MILLIGRAM(S): at 06:21

## 2018-01-14 RX ADMIN — TAMSULOSIN HYDROCHLORIDE 0.4 MILLIGRAM(S): 0.4 CAPSULE ORAL at 21:29

## 2018-01-14 RX ADMIN — Medication 500 MILLIGRAM(S): at 11:45

## 2018-01-14 RX ADMIN — Medication 25 MILLIGRAM(S): at 05:17

## 2018-01-14 RX ADMIN — SENNA PLUS 2 TABLET(S): 8.6 TABLET ORAL at 21:29

## 2018-01-14 RX ADMIN — Medication 325 MILLIGRAM(S): at 11:45

## 2018-01-14 RX ADMIN — GABAPENTIN 300 MILLIGRAM(S): 400 CAPSULE ORAL at 21:29

## 2018-01-14 RX ADMIN — Medication 50 MILLIGRAM(S): at 05:17

## 2018-01-14 RX ADMIN — Medication 112 MICROGRAM(S): at 05:17

## 2018-01-14 NOTE — PROGRESS NOTE ADULT - SUBJECTIVE AND OBJECTIVE BOX
CARDIOLOGY ATTENDING    no palpitations, no syncope, no angina    acetaminophen   Tablet. 650 milliGRAM(s) Oral every 6 hours PRN  ascorbic acid 500 milliGRAM(s) Oral daily  aspirin enteric coated 81 milliGRAM(s) Oral daily  bisacodyl 5 milliGRAM(s) Oral every 12 hours PRN  ferrous    sulfate 325 milliGRAM(s) Oral daily  gabapentin 300 milliGRAM(s) Oral every 8 hours  heparin  Injectable 5000 Unit(s) SubCutaneous every 8 hours  hydrochlorothiazide 25 milliGRAM(s) Oral daily  levothyroxine 112 MICROGram(s) Oral daily  losartan 50 milliGRAM(s) Oral daily  metoprolol succinate ER 50 milliGRAM(s) Oral daily  senna 2 Tablet(s) Oral at bedtime  simvastatin 40 milliGRAM(s) Oral at bedtime  tamsulosin 0.4 milliGRAM(s) Oral at bedtime  traMADol 50 milliGRAM(s) Oral daily      T(C): 36.7 (01-14-18 @ 13:54), Max: 36.7 (01-14-18 @ 13:54)  HR: 78 (01-14-18 @ 13:54) (74 - 81)  BP: 99/66 (01-14-18 @ 13:54) (99/66 - 144/88)  RR: 18 (01-14-18 @ 13:54) (17 - 18)  SpO2: 99% (01-14-18 @ 13:54) (97% - 99%)  Wt(kg): --    no JVD  RRR, no murmurs  CTAB  soft nt/nd  no c/c/e    echo pending  cath report pending    A/P) 68 y/o male PMH HTN, hyperlipidemia, DM, CAD s/p PCI (June 2017) at [facility], and MV repair being worked up for spinal stenosis surgery. RCRI score is already 2, making him intermediate risk for intermediate risk surgery.     -given PCI within the past year would continue ASA 81  -get cath report. If the stent was SONIA then he would in addition require plavix until at least June 2018  -get echo for cardiac risk stratification  - upon dc f/u with Dr Randall for cardio [phone]  -final cardiac reccs pending echo and [facility] cath report

## 2018-01-14 NOTE — CHART NOTE - NSCHARTNOTEFT_GEN_A_CORE
Release of information forms filled out with patient and placed in chart- tried calling Dr. Sahni office at [phone] for fax; office cherri FARIAS 8am-6pm; No fax number provided over internet search;  will sign out to covering colleague to inquire fax number and send releases forms to obtain cath report as per cardiology

## 2018-01-14 NOTE — PROGRESS NOTE ADULT - SUBJECTIVE AND OBJECTIVE BOX
Patient is a 67y old  Male who presents with a chief complaint of couldn't walk or stand, weakness. (10 Amilcar 2018 17:14)  pt seen and examined at bedside   SUBJECTIVE/OVERNIGHT events none. feels well     Vital Signs Last 24 Hrs  T(C): 36.5 (14 Jan 2018 05:16), Max: 36.5 (14 Jan 2018 05:16)  T(F): 97.7 (14 Jan 2018 05:16), Max: 97.7 (14 Jan 2018 05:16)  HR: 74 (14 Jan 2018 05:16) (64 - 81)  BP: 144/88 (14 Jan 2018 05:16) (124/76 - 144/88)  BP(mean): --  RR: 17 (14 Jan 2018 05:16) (17 - 18)  SpO2: 98% (14 Jan 2018 05:16) (97% - 98%)  CAPILLARY BLOOD GLUCOSE      POCT Blood Glucose.: 109 mg/dL (13 Jan 2018 22:11)  POCT Blood Glucose.: 83 mg/dL (13 Jan 2018 17:56)  POCT Blood Glucose.: 141 mg/dL (13 Jan 2018 13:03)    MEDICATIONS  (STANDING):  ascorbic acid 500 milliGRAM(s) Oral daily  aspirin enteric coated 81 milliGRAM(s) Oral daily  ferrous    sulfate 325 milliGRAM(s) Oral daily  gabapentin 300 milliGRAM(s) Oral every 8 hours  heparin  Injectable 5000 Unit(s) SubCutaneous every 8 hours  hydrochlorothiazide 25 milliGRAM(s) Oral daily  levothyroxine 112 MICROGram(s) Oral daily  losartan 50 milliGRAM(s) Oral daily  metoprolol succinate ER 50 milliGRAM(s) Oral daily  senna 2 Tablet(s) Oral at bedtime  simvastatin 40 milliGRAM(s) Oral at bedtime  tamsulosin 0.4 milliGRAM(s) Oral at bedtime  traMADol 50 milliGRAM(s) Oral daily    MEDICATIONS  (PRN):  acetaminophen   Tablet. 650 milliGRAM(s) Oral every 6 hours PRN Mild Pain -and moderate pain  bisacodyl 5 milliGRAM(s) Oral every 12 hours PRN Constipation    PHYSICAL EXAM  General : comfortable, not in any acute distress  Neck : supple, no LAD, no JVD  Eyes : EOMI, PERRLA, conjunctiva : no icterus, no pallor  MM moist, no pharyngeal erythema/exudates  Pulmonary: clear to auscultation bilateral lung fields, no crackles/rhonchi/wheezing,   CVS : S1 S2,rate normal-,rhythm-regular, no murmurs, no abnormal sounds  Gastrointestinal: soft, non tender, non distended, no organomegaly , bowel sounds audible  Extremities: no edema  Neuro: AAOx3, no focal deficits  Musculoskeletal:  FROM of all ext  Skin: no rash  LABS            RADIOLOGY and IMAGING reviewed: yes  Consultant notes reviewed : yes  Care discussed with Consultants/other providers :

## 2018-01-14 NOTE — CHART NOTE - NSCHARTNOTEFT_GEN_A_CORE
Patient stated he does not want to take ASA 2/2 possible orthopedic surgery. Patient made aware of cardiologist recommendations and pending CAth report which will then decide orthopedic plan. Patient stated " I fully understand the risk I am taking refusing ASA at this time, but I don't want to prolong an othropedic procedure anymore b/c of the ASA" RN Doris aware for patient's comment. Patient advised by my self and RN to report any signs of Chest pain or changes in his condition immediately. Patient stated "I will definitely alert staff, I understand what the risk"

## 2018-01-15 LAB
BUN SERPL-MCNC: 14 MG/DL — SIGNIFICANT CHANGE UP (ref 7–23)
CALCIUM SERPL-MCNC: 9 MG/DL — SIGNIFICANT CHANGE UP (ref 8.4–10.5)
CHLORIDE SERPL-SCNC: 99 MMOL/L — SIGNIFICANT CHANGE UP (ref 98–107)
CO2 SERPL-SCNC: 31 MMOL/L — SIGNIFICANT CHANGE UP (ref 22–31)
CREAT SERPL-MCNC: 0.53 MG/DL — SIGNIFICANT CHANGE UP (ref 0.5–1.3)
GLUCOSE BLDC GLUCOMTR-MCNC: 131 MG/DL — HIGH (ref 70–99)
GLUCOSE BLDC GLUCOMTR-MCNC: 155 MG/DL — HIGH (ref 70–99)
GLUCOSE BLDC GLUCOMTR-MCNC: 86 MG/DL — SIGNIFICANT CHANGE UP (ref 70–99)
GLUCOSE BLDC GLUCOMTR-MCNC: 99 MG/DL — SIGNIFICANT CHANGE UP (ref 70–99)
GLUCOSE SERPL-MCNC: 79 MG/DL — SIGNIFICANT CHANGE UP (ref 70–99)
HCT VFR BLD CALC: 41.4 % — SIGNIFICANT CHANGE UP (ref 39–50)
HGB BLD-MCNC: 12.5 G/DL — LOW (ref 13–17)
MCHC RBC-ENTMCNC: 18.2 PG — LOW (ref 27–34)
MCHC RBC-ENTMCNC: 30.2 % — LOW (ref 32–36)
MCV RBC AUTO: 60.4 FL — LOW (ref 80–100)
NRBC # FLD: 0 — SIGNIFICANT CHANGE UP
PLATELET # BLD AUTO: 224 K/UL — SIGNIFICANT CHANGE UP (ref 150–400)
PMV BLD: SIGNIFICANT CHANGE UP FL (ref 7–13)
POTASSIUM SERPL-MCNC: 4.1 MMOL/L — SIGNIFICANT CHANGE UP (ref 3.5–5.3)
POTASSIUM SERPL-SCNC: 4.1 MMOL/L — SIGNIFICANT CHANGE UP (ref 3.5–5.3)
RBC # BLD: 6.85 M/UL — HIGH (ref 4.2–5.8)
RBC # FLD: 19.4 % — HIGH (ref 10.3–14.5)
SODIUM SERPL-SCNC: 141 MMOL/L — SIGNIFICANT CHANGE UP (ref 135–145)
WBC # BLD: 11.44 K/UL — HIGH (ref 3.8–10.5)
WBC # FLD AUTO: 11.44 K/UL — HIGH (ref 3.8–10.5)

## 2018-01-15 PROCEDURE — 93306 TTE W/DOPPLER COMPLETE: CPT | Mod: 26

## 2018-01-15 RX ADMIN — TRAMADOL HYDROCHLORIDE 50 MILLIGRAM(S): 50 TABLET ORAL at 12:57

## 2018-01-15 RX ADMIN — GABAPENTIN 300 MILLIGRAM(S): 400 CAPSULE ORAL at 05:46

## 2018-01-15 RX ADMIN — LOSARTAN POTASSIUM 50 MILLIGRAM(S): 100 TABLET, FILM COATED ORAL at 05:47

## 2018-01-15 RX ADMIN — Medication 325 MILLIGRAM(S): at 12:54

## 2018-01-15 RX ADMIN — Medication 650 MILLIGRAM(S): at 22:50

## 2018-01-15 RX ADMIN — Medication 650 MILLIGRAM(S): at 06:48

## 2018-01-15 RX ADMIN — SIMVASTATIN 40 MILLIGRAM(S): 20 TABLET, FILM COATED ORAL at 21:51

## 2018-01-15 RX ADMIN — GABAPENTIN 300 MILLIGRAM(S): 400 CAPSULE ORAL at 21:51

## 2018-01-15 RX ADMIN — TAMSULOSIN HYDROCHLORIDE 0.4 MILLIGRAM(S): 0.4 CAPSULE ORAL at 21:51

## 2018-01-15 RX ADMIN — Medication 50 MILLIGRAM(S): at 05:48

## 2018-01-15 RX ADMIN — Medication 25 MILLIGRAM(S): at 05:48

## 2018-01-15 RX ADMIN — Medication 112 MICROGRAM(S): at 05:48

## 2018-01-15 RX ADMIN — Medication 81 MILLIGRAM(S): at 12:55

## 2018-01-15 RX ADMIN — TRAMADOL HYDROCHLORIDE 50 MILLIGRAM(S): 50 TABLET ORAL at 14:00

## 2018-01-15 RX ADMIN — Medication 500 MILLIGRAM(S): at 12:55

## 2018-01-15 RX ADMIN — Medication 650 MILLIGRAM(S): at 21:50

## 2018-01-15 RX ADMIN — Medication 650 MILLIGRAM(S): at 05:48

## 2018-01-15 RX ADMIN — GABAPENTIN 300 MILLIGRAM(S): 400 CAPSULE ORAL at 15:06

## 2018-01-15 RX ADMIN — SENNA PLUS 2 TABLET(S): 8.6 TABLET ORAL at 21:51

## 2018-01-15 NOTE — PROGRESS NOTE ADULT - SUBJECTIVE AND OBJECTIVE BOX
CARDIOLOGY     no palpitations, no syncope, no angina      MEDICATIONS  (STANDING):  ascorbic acid 500 milliGRAM(s) Oral daily  aspirin enteric coated 81 milliGRAM(s) Oral daily  ferrous    sulfate 325 milliGRAM(s) Oral daily  gabapentin 300 milliGRAM(s) Oral every 8 hours  heparin  Injectable 5000 Unit(s) SubCutaneous every 8 hours  hydrochlorothiazide 25 milliGRAM(s) Oral daily  levothyroxine 112 MICROGram(s) Oral daily  losartan 50 milliGRAM(s) Oral daily  metoprolol succinate ER 50 milliGRAM(s) Oral daily  senna 2 Tablet(s) Oral at bedtime  simvastatin 40 milliGRAM(s) Oral at bedtime  tamsulosin 0.4 milliGRAM(s) Oral at bedtime  traMADol 50 milliGRAM(s) Oral daily    MEDICATIONS  (PRN):  acetaminophen   Tablet. 650 milliGRAM(s) Oral every 6 hours PRN Mild Pain -and moderate pain  bisacodyl 5 milliGRAM(s) Oral every 12 hours PRN Constipation    LABS:                        12.5   11.44 )-----------( 224      ( 15 Amilcar 2018 04:55 )             41.4    141  |  99  |  14  ----------------------------<  79  4.1   |  31  |  0.53    Ca    9.0      15 Amilcar 2018 04:55     PHYSICAL EXAM  Vital Signs Last 24 Hrs  T(C): 36.5 (15 Amilcar 2018 05:29), Max: 36.7 (14 Jan 2018 13:54)  T(F): 97.7 (15 Amilcar 2018 05:29), Max: 98 (14 Jan 2018 13:54)  HR: 80 (15 Amilcar 2018 05:29) (69 - 80)  BP: 145/87 (15 Amilcar 2018 05:29) (99/66 - 145/87)  RR: 18 (15 Amilcar 2018 05:29) (18 - 18)  SpO2: 97% (15 Amilcar 2018 05:29) (97% - 100%)    no JVD  RRR, no murmurs  CTAB  soft nt/nd  no c/c/e    echo pending  cath report pending    A/P) 68 y/o male PMH HTN, hyperlipidemia, DM, CAD s/p PCI (June 2017) at [facility], and MV repair being worked up for spinal stenosis surgery. RCRI score is already 2, making him intermediate risk for intermediate risk surgery.     -given PCI within the past year would continue ASA 81  -get cath report. If the stent was SONIA then he would in addition require plavix until at least June 2018  -get echo for cardiac risk stratification  -upon dc f/u with Dr Randall for cardio [phone]  -final cardiac reccs pending echo and [facility] cath report    Kerry Siddiqui PA-C  [facility]  [address], [address]   [place] [address]  office [phone]  pager [phone]

## 2018-01-15 NOTE — PROGRESS NOTE ADULT - SUBJECTIVE AND OBJECTIVE BOX
Patient is a 67y old  Male who presents with a chief complaint of couldn't walk or stand, weakness. (10 Amilcar 2018 17:14)  pt seen and examined at bedside   SUBJECTIVE/OVERNIGHT events none, no complaints  Vital Signs Last 24 Hrs  T(C): 36.8 (15 Amilcar 2018 13:14), Max: 36.8 (15 Amilcar 2018 13:14)  T(F): 98.2 (15 Amilcar 2018 13:14), Max: 98.2 (15 Amilcar 2018 13:14)  HR: 73 (15 Amilcar 2018 13:14) (69 - 80)  BP: 137/81 (15 Amilcar 2018 13:14) (135/81 - 145/87)  BP(mean): --  RR: 18 (15 Amilcar 2018 05:29) (18 - 18)  SpO2: 99% (15 Amilcar 2018 13:14) (97% - 100%)  CAPILLARY BLOOD GLUCOSE      POCT Blood Glucose.: 155 mg/dL (15 Amilcar 2018 12:32)  POCT Blood Glucose.: 86 mg/dL (15 Amilcar 2018 08:25)  POCT Blood Glucose.: 101 mg/dL (14 Jan 2018 22:29)    MEDICATIONS  (STANDING):  ascorbic acid 500 milliGRAM(s) Oral daily  aspirin enteric coated 81 milliGRAM(s) Oral daily  ferrous    sulfate 325 milliGRAM(s) Oral daily  gabapentin 300 milliGRAM(s) Oral every 8 hours  heparin  Injectable 5000 Unit(s) SubCutaneous every 8 hours  hydrochlorothiazide 25 milliGRAM(s) Oral daily  levothyroxine 112 MICROGram(s) Oral daily  losartan 50 milliGRAM(s) Oral daily  metoprolol succinate ER 50 milliGRAM(s) Oral daily  senna 2 Tablet(s) Oral at bedtime  simvastatin 40 milliGRAM(s) Oral at bedtime  tamsulosin 0.4 milliGRAM(s) Oral at bedtime  traMADol 50 milliGRAM(s) Oral daily    MEDICATIONS  (PRN):  acetaminophen   Tablet. 650 milliGRAM(s) Oral every 6 hours PRN Mild Pain -and moderate pain  bisacodyl 5 milliGRAM(s) Oral every 12 hours PRN Constipation    PHYSICAL EXAM  General : comfortable, not in any acute distress  Neck : supple, no LAD, no JVD  Eyes : EOMI, PERRLA, conjunctiva : no icterus, no pallor  MM moist, no pharyngeal erythema/exudates  Pulmonary: clear to auscultation bilateral lung fields, no crackles/rhonchi/wheezing,   CVS : S1 S2,rate normal-,rhythm-regular, no murmurs, no abnormal sounds  Gastrointestinal: soft, non tender, non distended, no organomegaly , bowel sounds audible  Extremities: no edema  Neuro: AAOx3, no focal deficits  Musculoskeletal:  FROM of all ext  Skin: no rash  LABS                        12.5   11.44 )-----------( 224      ( 15 Amilcar 2018 04:55 )             41.4     01-15    141  |  99  |  14  ----------------------------<  79  4.1   |  31  |  0.53    Ca    9.0      15 Amilcar 2018 04:55        RADIOLOGY and IMAGING reviewed: yes  Consultant notes reviewed : yes  Care discussed with Consultants/other providers :

## 2018-01-15 NOTE — CHART NOTE - NSCHARTNOTEFT_GEN_A_CORE
Dr. Sahni [facility] cardiologist of patient office is still closed, fax number given by answering service [phone] . Advised to send fax and call back to see if open later.

## 2018-01-16 ENCOUNTER — TRANSCRIPTION ENCOUNTER (OUTPATIENT)
Age: 68
End: 2018-01-16

## 2018-01-16 LAB
GLUCOSE BLDC GLUCOMTR-MCNC: 102 MG/DL — HIGH (ref 70–99)
GLUCOSE BLDC GLUCOMTR-MCNC: 118 MG/DL — HIGH (ref 70–99)
GLUCOSE BLDC GLUCOMTR-MCNC: 119 MG/DL — HIGH (ref 70–99)
GLUCOSE BLDC GLUCOMTR-MCNC: 98 MG/DL — SIGNIFICANT CHANGE UP (ref 70–99)

## 2018-01-16 RX ORDER — CLOPIDOGREL BISULFATE 75 MG/1
75 TABLET, FILM COATED ORAL DAILY
Qty: 0 | Refills: 0 | Status: DISCONTINUED | OUTPATIENT
Start: 2018-01-16 | End: 2018-01-19

## 2018-01-16 RX ADMIN — Medication 25 MILLIGRAM(S): at 06:04

## 2018-01-16 RX ADMIN — SIMVASTATIN 40 MILLIGRAM(S): 20 TABLET, FILM COATED ORAL at 23:11

## 2018-01-16 RX ADMIN — TAMSULOSIN HYDROCHLORIDE 0.4 MILLIGRAM(S): 0.4 CAPSULE ORAL at 23:10

## 2018-01-16 RX ADMIN — Medication 5 MILLIGRAM(S): at 23:10

## 2018-01-16 RX ADMIN — TRAMADOL HYDROCHLORIDE 50 MILLIGRAM(S): 50 TABLET ORAL at 12:16

## 2018-01-16 RX ADMIN — Medication 112 MICROGRAM(S): at 06:03

## 2018-01-16 RX ADMIN — GABAPENTIN 300 MILLIGRAM(S): 400 CAPSULE ORAL at 13:00

## 2018-01-16 RX ADMIN — Medication 50 MILLIGRAM(S): at 06:04

## 2018-01-16 RX ADMIN — LOSARTAN POTASSIUM 50 MILLIGRAM(S): 100 TABLET, FILM COATED ORAL at 06:03

## 2018-01-16 RX ADMIN — SENNA PLUS 2 TABLET(S): 8.6 TABLET ORAL at 23:10

## 2018-01-16 RX ADMIN — GABAPENTIN 300 MILLIGRAM(S): 400 CAPSULE ORAL at 06:03

## 2018-01-16 RX ADMIN — Medication 500 MILLIGRAM(S): at 11:13

## 2018-01-16 RX ADMIN — Medication 325 MILLIGRAM(S): at 11:12

## 2018-01-16 RX ADMIN — HEPARIN SODIUM 5000 UNIT(S): 5000 INJECTION INTRAVENOUS; SUBCUTANEOUS at 23:10

## 2018-01-16 RX ADMIN — TRAMADOL HYDROCHLORIDE 50 MILLIGRAM(S): 50 TABLET ORAL at 11:16

## 2018-01-16 RX ADMIN — Medication 81 MILLIGRAM(S): at 11:13

## 2018-01-16 NOTE — DISCHARGE NOTE ADULT - PATIENT PORTAL LINK FT
“You can access the Follow[employer] Patient Portal, offered by [facility], by registering with the following website: [email or web address]”

## 2018-01-16 NOTE — DISCHARGE NOTE ADULT - MEDICATION SUMMARY - MEDICATIONS TO TAKE
I will START or STAY ON the medications listed below when I get home from the hospital:    aspirin 81 mg oral tablet  -- 1 tab(s) by mouth once a day  -- Indication: For Need for PPX measures     acetaminophen 325 mg oral tablet  -- 2 tab(s) by mouth every 6 hours, As needed, Moderate and Severe Pain  -- Indication: For Pain    traMADol 50 mg oral tablet  -- 50 milligram(s) by mouth once a day  -- Indication: For Pain     losartan 50 mg oral tablet  -- 1 tab(s) by mouth once a day  -- Indication: For HTN (hypertension)    Flomax 0.4 mg oral capsule  -- 1 cap(s) by mouth once a day (at bedtime)  -- Indication: For BPH (benign prostatic hyperplasia)    gabapentin 100 mg oral tablet  -- 3 tab(s) by mouth every 8 hours  -- Indication: For Neuropathic pain     simvastatin 40 mg oral tablet  -- 1 tab(s) by mouth once a day (at bedtime)  -- Indication: For HLD     Plavix 75 mg oral tablet  -- 1 tab(s) by mouth once a day  -- Indication: For CAD     metoprolol succinate 50 mg oral tablet, extended release  -- 1 tab(s) by mouth once a day  -- Indication: For HT N    hydroCHLOROthiazide 25 mg oral tablet  -- 1 tab(s) by mouth once a day  -- Indication: For HTN (hypertension)    ferrous sulfate 325 mg (65 mg elemental iron) oral delayed release tablet  -- 1 tab(s) by mouth once a day  -- Indication: For Supplement     senna 8.6 mg oral tablet  -- 2 tab(s) by mouth once a day (at bedtime)  -- Indication: For Constipation     polyethylene glycol 3350 oral powder for reconstitution  -- 17 gram(s) by mouth once a day, As needed, Constipation  -- Indication: For Constipation     bisacodyl 10 mg rectal suppository  -- 1 suppository(ies) rectally once a day, As needed, Constipation  -- Indication: For Constipation     sodium chloride 0.65% nasal spray  -- 1 spray(s) into nose every 4 hours  -- Indication: For Nasal spray     levothyroxine 112 mcg (0.112 mg) oral tablet  -- 1 tab(s) by mouth once a day  -- Indication: For Hypothyroid     ascorbic acid 500 mg oral tablet  -- 1 tab(s) by mouth once a day  -- Indication: For Supplement

## 2018-01-16 NOTE — DISCHARGE NOTE ADULT - SECONDARY DIAGNOSIS.
DM (diabetes mellitus), type 2 Acquired hypothyroidism BPH (benign prostatic hyperplasia) Dermatitis associated with moisture Coronary artery disease involving native coronary artery of native heart without angina pectoris HTN (hypertension)

## 2018-01-16 NOTE — DISCHARGE NOTE ADULT - HOSPITAL COURSE
67M hx of CAD s/p 4 stents (6/2017), MVR s/p repair (6/2017), thalassemia, Cervical and Lumbar Spinal Stenosis (C3-C7 fusion performed 3/2013), DM2. Presented to ED s/p fall and b/l LE weakness.         Spinal stenosis of lumbar w/ LE weakness  -xray of bilateral hip/lumbar spine:1. No fractures or dislocations.  2. Degenerative changes of the lumbosacral spine.  - MRI L- spine-Severe central spinal canal stenosis L3-L4 and L4-L5   -Ortho following :pt can f/u as outpt for scheduling Dr. Александр Trejo   - Pain control PRN   -ECG-Echo  for (pre-op clearance) obtain repeat TTE to further risk stratify: normal LV function    CAD with SONIA 6/2017  - C/w DAPT, Zocor, and Metoprolol.   -Pt had Promus SONIA placed to the LCX and LPDA on June 23, 2017.  Resume Plavix.  Pt should be on DAPT for 1 year following SONIA.  -c/w aspirin and plavix for atleast 1 year     HTN  - Losartan and Hctz.     Type 2 DM  -Hg A1c 5.6 % off meds    HYPOTHYROIDISM   - levothyroxine     STAGE 2 SACRAL DECUBITUS ULCER  -moisture associated dermatitis  - wound care recs noted    BPH  - Flomax qHS    DVT PPX   - HSQ 5000 U q8hr    Dispo: 67M hx of CAD s/p 4 stents (6/2017), MVR s/p repair (6/2017), thalassemia, Cervical and Lumbar Spinal Stenosis (C3-C7 fusion performed 3/2013), DM2. Presented to ED s/p fall and b/l LE weakness.       Spinal stenosis of lumbar w/ LE weakness  -xray of bilateral hip/lumbar spine:1. No fractures or dislocations.  2. Degenerative changes of the lumbosacral spine.  - MRI L- spine-Severe central spinal canal stenosis L3-L4 and L4-L5   -Ortho following :pt can f/u as outpt for scheduling Dr. Александр Trejo   - Pain control PRN   -ECG-Echo  for (pre-op clearance) obtain repeat TTE to further risk stratify: normal LV function    CAD with SONIA 6/2017  - C/w DAPT, Zocor, and Metoprolol.   -Pt had Promus SONIA placed to the LCX and LPDA on June 23, 2017.  Resume Plavix.  Pt should be on DAPT for 1 year following SONIA.  -c/w aspirin and plavix for atleast 1 year     HTN  - Losartan and Hctz.     Type 2 DM  -Hg A1c 5.6 % off meds    HYPOTHYROIDISM   - levothyroxine     STAGE 2 SACRAL DECUBITUS ULCER  -moisture associated dermatitis  - wound team consulted    BPH  - Flomax qHS    DVT PPX   - HSQ 5000 U q8hr    Dispo: 67M hx of CAD s/p 4 stents (6/2017), MVR s/p repair (6/2017), thalassemia, Cervical and Lumbar Spinal Stenosis (C3-C7 fusion performed 3/2013), DM2. Presented to ED s/p fall and b/l LE weakness.       Spinal stenosis of lumbar w/ LE weakness  -xray of bilateral hip/lumbar spine:1. No fractures or dislocations.  2. Degenerative changes of the lumbosacral spine.  - MRI L- spine-Severe central spinal canal stenosis L3-L4 and L4-L5   -Ortho following :pt can f/u as outpt for scheduling Dr. Александр Trejo   - Pain control PRN   -ECG-Echo  for (pre-op clearance) obtain repeat TTE to further risk stratify: normal LV function    CAD with SONIA 6/2017  - C/w DAPT, Zocor, and Metoprolol.   -Pt had Promus SONIA placed to the LCX and LPDA on June 23, 2017.  Resume Plavix.  Pt should be on DAPT for 1 year following SONIA.  -c/w aspirin and plavix for atleast 1 year     HTN  - Losartan and Hctz.     Type 2 DM  -Hg A1c 5.6 % off meds    HYPOTHYROIDISM   - levothyroxine     STAGE 2 SACRAL DECUBITUS ULCER  -moisture associated dermatitis  - wound team consulted    BPH  - Flomax qHS    DVT PPX   - HSQ 5000 U q8hr    Dispo: Rehab 67M hx of CAD s/p 4 stents (6/2017), MVR s/p repair (6/2017), thalassemia, Cervical and Lumbar Spinal Stenosis (C3-C7 fusion performed 3/2013), DM2. Presented to ED s/p fall and b/l LE weakness.     Spinal stenosis of lumbar w/ LE weakness  -xray of bilateral hip/lumbar spine:1. No fractures or dislocations.  2. Degenerative changes of the lumbosacral spine.  - MRI L- spine-Severe central spinal canal stenosis L3-L4 and L4-L5   -Ortho following :pt can f/u as outpt for scheduling Dr. Александр Trejo   - can't do surgery due to recent cardiac stents and being on Asa/Plavix.  - Pain control PRN   -ECG-Echo  for (pre-op clearance) obtain repeat TTE to further risk stratify: normal LV function    CAD with SONIA 6/2017  - C/w DAPT, Zocor, and Metoprolol.   -Pt had Promus SONIA placed to the LCX and LPDA on June 23, 2017.  Resume Plavix.  Pt should be on DAPT for 1 year following SONIA.  -c/w aspirin and plavix for atleast 1 year     HTN  - Losartan and Hctz.     Type 2 DM  -Hg A1c 5.6 % off meds    HYPOTHYROIDISM   - levothyroxine     STAGE 2 SACRAL DECUBITUS ULCER  -moisture associated dermatitis  - wound team consulted    BPH  - Flomax qHS    DVT PPX   - HSQ 5000 U q8hr    Dispo: Rehab

## 2018-01-16 NOTE — DISCHARGE NOTE ADULT - ADDITIONAL INSTRUCTIONS
Wound care:     Topical recommendations:    Right scapula- healing surgical wound- Apply liquid barrier film, cover with silicone foam with border. Change every three days.    left r lower leg- multiple scabs from fall- Gently cleanse with NS. Pat dry. Apply Liquid barrier film. Cover with silicone foam without border, secure with spandage tubular stretch netting. Change every 72 hours.    Sacrum extending to bilateral buttocks- Cleanse with skin cleanser, dry well. Apply liquid barrier film to periwound skin and areas of hypopigmentation. Cover with Silicone foam with border. Change daily.    Continue low air loss bed therapy, apply liquid barrier film to bilateral heels and right achilles daily, continue heel elevation with Z-flex fluidized positioning boots, continue to turn & reposition q2h with Z-ghulam fluidized positioning device, soft pillow between bony prominences, continue moisture management with barrier creams & single breathable pad, continue measures to decrease friction/shear/pressure.

## 2018-01-16 NOTE — DISCHARGE NOTE ADULT - CARE PROVIDER_API CALL
Odin Trejo (MD), Orthopaedic Surgery  [address] [place] [address]  Phone: [phone]  Fax: [phone] Odin Trejo (MD), Orthopaedic Surgery  [address] [place] [address]  Phone: [phone]  Fax: [phone] Odin Trejo), Orthopaedic Surgery  [address] [facility] [address]  [place] [address]  Phone: [phone]  Fax: [phone]    Kemar Jacobs), Internal Medicine  [address] [place] [address]  Phone: [phone]  Fax: [phone]

## 2018-01-16 NOTE — PROGRESS NOTE ADULT - SUBJECTIVE AND OBJECTIVE BOX
Patient is a 67y old  Male who presents with a chief complaint of couldn't walk or stand, weakness. (10 Amilcar 2018 17:14)  pt seen and examined at bedside   SUBJECTIVE/OVERNIGHT events noted, feels well    Vital Signs Last 24 Hrs  T(C): 36.7 (16 Jan 2018 12:55), Max: 36.7 (16 Jan 2018 12:55)  T(F): 98 (16 Jan 2018 12:55), Max: 98 (16 Jan 2018 12:55)  HR: 78 (16 Jan 2018 12:55) (62 - 78)  BP: 110/66 (16 Jan 2018 12:55) (107/58 - 140/83)  BP(mean): --  RR: 18 (16 Jan 2018 05:50) (18 - 18)  SpO2: 99% (16 Jan 2018 12:55) (98% - 100%)  CAPILLARY BLOOD GLUCOSE      POCT Blood Glucose.: 119 mg/dL (16 Jan 2018 12:36)  POCT Blood Glucose.: 98 mg/dL (16 Jan 2018 08:40)  POCT Blood Glucose.: 131 mg/dL (15 Amilcar 2018 22:13)  POCT Blood Glucose.: 99 mg/dL (15 Amilcar 2018 18:12)    MEDICATIONS  (STANDING):  ascorbic acid 500 milliGRAM(s) Oral daily  aspirin enteric coated 81 milliGRAM(s) Oral daily  ferrous    sulfate 325 milliGRAM(s) Oral daily  gabapentin 300 milliGRAM(s) Oral every 8 hours  heparin  Injectable 5000 Unit(s) SubCutaneous every 8 hours  hydrochlorothiazide 25 milliGRAM(s) Oral daily  levothyroxine 112 MICROGram(s) Oral daily  losartan 50 milliGRAM(s) Oral daily  metoprolol succinate ER 50 milliGRAM(s) Oral daily  senna 2 Tablet(s) Oral at bedtime  simvastatin 40 milliGRAM(s) Oral at bedtime  tamsulosin 0.4 milliGRAM(s) Oral at bedtime  traMADol 50 milliGRAM(s) Oral daily    MEDICATIONS  (PRN):  acetaminophen   Tablet. 650 milliGRAM(s) Oral every 6 hours PRN Mild Pain -and moderate pain  bisacodyl 5 milliGRAM(s) Oral every 12 hours PRN Constipation    PHYSICAL EXAM  General : comfortable, not in any acute distress  Neck : supple, no LAD, no JVD  Eyes : EOMI, PERRLA, conjunctiva : no icterus, no pallor  MM moist, no pharyngeal erythema/exudates  Pulmonary: clear to auscultation bilateral lung fields, no crackles/rhonchi/wheezing,   CVS : S1 S2,rate normal-,rhythm-regular, no murmurs, no abnormal sounds  Gastrointestinal: soft, non tender, non distended, no organomegaly , bowel sounds audible  Extremities: no edema  Neuro: AAOx3, no focal deficits  Musculoskeletal:  FROM of all ext  Skin: no rash  LABS                        12.5   11.44 )-----------( 224      ( 15 Amilcar 2018 04:55 )             41.4     01-15    141  |  99  |  14  ----------------------------<  79  4.1   |  31  |  0.53    Ca    9.0      15 Amilcar 2018 04:55        RADIOLOGY and IMAGING reviewed: yes  Consultant notes reviewed : yes  Care discussed with Consultants/other providers :

## 2018-01-16 NOTE — DISCHARGE NOTE ADULT - MEDICATION SUMMARY - MEDICATIONS TO STOP TAKING
I will STOP taking the medications listed below when I get home from the hospital:    metFORMIN 500 mg oral tablet  -- 1 tab(s) by mouth 2 times a day    lactobacillus acidophilus oral capsule  -- 1 cap(s) by mouth once a day

## 2018-01-16 NOTE — DISCHARGE NOTE ADULT - PLAN OF CARE
maintain adequate glucose control Your A1C is 5.6.  Your diabetes is well controlled.  Please follow up with your pcp Kemar Lora 1 week of discharge.  Please call to make an appointment continue synthroid Please follow up with your pcp Kemar Martinez- PCP within 1 week of discharge.  Please call to make an appointment continue current regimen Please follow up with your pcp Kemar oLra 1 week of discharge.  Please call to make an appointment skin to remain intact Topical recommendations:    Right scapula- healing surgical wound- Apply liquid barrier film, cover with silicone foam with border. Change every three days.    Right anterior lower leg- multiple scabs from fall- Gently cleanse with NS. Pat dry. Apply Liquid barrier film. Cover with silicone foam without border, secure with spandage tubular stretch netting. Change every 72 hours.    Sacrum extending to bilateral buttocks- Cleanse with skin cleanser, dry well. Apply liquid barrier film to periwound skin and areas of hypopigmentation. Cover with Silicone foam with border. Change daily.    Continue low air loss bed therapy, apply liquid barrier film to bilateral heels and right achilles daily, continue heel elevation with Z-flex fluidized positioning boots, continue to turn & reposition q2h with Z-ghulam fluidized positioning device, soft pillow between bony prominences, continue moisture management with barrier creams & single breathable pad, continue measures to decrease friction/shear/pressure. remain free of pain Please follow up with your pcp Kemar Martinez- PCP within 1 week of discharge.  Please call to make an appointment within 1 week of discharge.  Please follow up with orthopedic surgeon Dr. Александр Trejo for outpt intervention regarding surgical intervention.  Please call to make an appointment within 1 week of discharge. Please continue physical therapy and pain control as prescribed continue aspirin and plavix Please follow up with your cardiologist Dr. Randall [phone]. Please call to make an appointment within 1 week of discharge.  In June 2017 -Pt had Promus SONIA placed to the LCX and LPDA on June 23, 2017.  Resume Plavix.  Pt should be on DAPT for 1 year following SONIA.  -c/w aspirin and plavix for atleast 1 year maintain adequate blood pressure control Please check your blood pressure prior to taking your blood pressure medications.  Please follow up with your pcp Kemar Martinez- PCP and cardiologist within 1 week of discharge.  Please call to make an appointment.  Dr Randall Cardiologist [phone])

## 2018-01-16 NOTE — DISCHARGE NOTE ADULT - CARE PROVIDERS DIRECT ADDRESSES
,DirectAddress_Unknown ,DirectAddress_Unknown,[email or web address]

## 2018-01-16 NOTE — DISCHARGE NOTE ADULT - CARE PLAN
Principal Discharge DX:	Spinal stenosis of lumbar region  Goal:	remain free of pain  Assessment and plan of treatment:	Please follow up with your pcp Kemar Martinez- PCP within 1 week of discharge.  Please call to make an appointment within 1 week of discharge.  Please follow up with orthopedic surgeon Dr. Александр Trejo for outpt intervention regarding surgical intervention.  Please call to make an appointment within 1 week of discharge. Please continue physical therapy and pain control as prescribed  Secondary Diagnosis:	Coronary artery disease involving native coronary artery of native heart without angina pectoris  Goal:	continue aspirin and plavix  Assessment and plan of treatment:	Please follow up with your cardiologist Dr. Randall [phone]. Please call to make an appointment within 1 week of discharge.  In June 2017 -Pt had Promus SONIA placed to the LCX and LPDA on June 23, 2017.  Resume Plavix.  Pt should be on DAPT for 1 year following SONIA.  -c/w aspirin and plavix for atleast 1 year  Secondary Diagnosis:	HTN (hypertension)  Goal:	maintain adequate blood pressure control  Assessment and plan of treatment:	Please check your blood pressure prior to taking your blood pressure medications.  Please follow up with your pcp Kemar Martinez- PCP and cardiologist within 1 week of discharge.  Please call to make an appointment.  Dr Randall Cardiologist [phone])  Secondary Diagnosis:	DM (diabetes mellitus), type 2  Goal:	maintain adequate glucose control  Assessment and plan of treatment:	Your A1C is 5.6.  Your diabetes is well controlled.  Please follow up with your pcp Kemar RYANwithin 1 week of discharge.  Please call to make an appointment  Secondary Diagnosis:	Acquired hypothyroidism  Goal:	continue synthroid  Assessment and plan of treatment:	Please follow up with your pcp Kemar Martinez- PCP within 1 week of discharge.  Please call to make an appointment  Secondary Diagnosis:	BPH (benign prostatic hyperplasia)  Goal:	continue current regimen  Assessment and plan of treatment:	Please follow up with your pcp Kemar RYANwithin 1 week of discharge.  Please call to make an appointment  Secondary Diagnosis:	Dermatitis associated with moisture  Goal:	skin to remain intact  Assessment and plan of treatment:	Topical recommendations:    Right scapula- healing surgical wound- Apply liquid barrier film, cover with silicone foam with border. Change every three days.    Right anterior lower leg- multiple scabs from fall- Gently cleanse with NS. Pat dry. Apply Liquid barrier film. Cover with silicone foam without border, secure with spandage tubular stretch netting. Change every 72 hours.    Sacrum extending to bilateral buttocks- Cleanse with skin cleanser, dry well. Apply liquid barrier film to periwound skin and areas of hypopigmentation. Cover with Silicone foam with border. Change daily.    Continue low air loss bed therapy, apply liquid barrier film to bilateral heels and right achilles daily, continue heel elevation with Z-flex fluidized positioning boots, continue to turn & reposition q2h with Z-ghulam fluidized positioning device, soft pillow between bony prominences, continue moisture management with barrier creams & single breathable pad, continue measures to decrease friction/shear/pressure.

## 2018-01-17 LAB
BUN SERPL-MCNC: 13 MG/DL — SIGNIFICANT CHANGE UP (ref 7–23)
CALCIUM SERPL-MCNC: 9.2 MG/DL — SIGNIFICANT CHANGE UP (ref 8.4–10.5)
CHLORIDE SERPL-SCNC: 98 MMOL/L — SIGNIFICANT CHANGE UP (ref 98–107)
CO2 SERPL-SCNC: 32 MMOL/L — HIGH (ref 22–31)
CREAT SERPL-MCNC: 0.52 MG/DL — SIGNIFICANT CHANGE UP (ref 0.5–1.3)
GLUCOSE BLDC GLUCOMTR-MCNC: 106 MG/DL — HIGH (ref 70–99)
GLUCOSE BLDC GLUCOMTR-MCNC: 110 MG/DL — HIGH (ref 70–99)
GLUCOSE BLDC GLUCOMTR-MCNC: 91 MG/DL — SIGNIFICANT CHANGE UP (ref 70–99)
GLUCOSE BLDC GLUCOMTR-MCNC: 92 MG/DL — SIGNIFICANT CHANGE UP (ref 70–99)
GLUCOSE SERPL-MCNC: 89 MG/DL — SIGNIFICANT CHANGE UP (ref 70–99)
HCT VFR BLD CALC: 41 % — SIGNIFICANT CHANGE UP (ref 39–50)
HGB BLD-MCNC: 12.8 G/DL — LOW (ref 13–17)
MCHC RBC-ENTMCNC: 19.2 PG — LOW (ref 27–34)
MCHC RBC-ENTMCNC: 31.2 % — LOW (ref 32–36)
MCV RBC AUTO: 61.6 FL — LOW (ref 80–100)
NRBC # FLD: 0 — SIGNIFICANT CHANGE UP
PLATELET # BLD AUTO: 235 K/UL — SIGNIFICANT CHANGE UP (ref 150–400)
PMV BLD: SIGNIFICANT CHANGE UP FL (ref 7–13)
POTASSIUM SERPL-MCNC: 3.8 MMOL/L — SIGNIFICANT CHANGE UP (ref 3.5–5.3)
POTASSIUM SERPL-SCNC: 3.8 MMOL/L — SIGNIFICANT CHANGE UP (ref 3.5–5.3)
RBC # BLD: 6.66 M/UL — HIGH (ref 4.2–5.8)
RBC # FLD: 18.8 % — HIGH (ref 10.3–14.5)
SODIUM SERPL-SCNC: 141 MMOL/L — SIGNIFICANT CHANGE UP (ref 135–145)
WBC # BLD: 10.44 K/UL — SIGNIFICANT CHANGE UP (ref 3.8–10.5)
WBC # FLD AUTO: 10.44 K/UL — SIGNIFICANT CHANGE UP (ref 3.8–10.5)

## 2018-01-17 RX ORDER — POLYETHYLENE GLYCOL 3350 17 G/17G
17 POWDER, FOR SOLUTION ORAL DAILY
Qty: 0 | Refills: 0 | Status: DISCONTINUED | OUTPATIENT
Start: 2018-01-17 | End: 2018-01-19

## 2018-01-17 RX ADMIN — Medication 650 MILLIGRAM(S): at 07:43

## 2018-01-17 RX ADMIN — CLOPIDOGREL BISULFATE 75 MILLIGRAM(S): 75 TABLET, FILM COATED ORAL at 06:48

## 2018-01-17 RX ADMIN — Medication 500 MILLIGRAM(S): at 12:29

## 2018-01-17 RX ADMIN — GABAPENTIN 300 MILLIGRAM(S): 400 CAPSULE ORAL at 13:25

## 2018-01-17 RX ADMIN — SIMVASTATIN 40 MILLIGRAM(S): 20 TABLET, FILM COATED ORAL at 21:39

## 2018-01-17 RX ADMIN — Medication 650 MILLIGRAM(S): at 06:41

## 2018-01-17 RX ADMIN — Medication 650 MILLIGRAM(S): at 21:39

## 2018-01-17 RX ADMIN — TRAMADOL HYDROCHLORIDE 50 MILLIGRAM(S): 50 TABLET ORAL at 12:29

## 2018-01-17 RX ADMIN — LOSARTAN POTASSIUM 50 MILLIGRAM(S): 100 TABLET, FILM COATED ORAL at 06:42

## 2018-01-17 RX ADMIN — Medication 650 MILLIGRAM(S): at 22:05

## 2018-01-17 RX ADMIN — GABAPENTIN 300 MILLIGRAM(S): 400 CAPSULE ORAL at 06:42

## 2018-01-17 RX ADMIN — Medication 50 MILLIGRAM(S): at 06:42

## 2018-01-17 RX ADMIN — TAMSULOSIN HYDROCHLORIDE 0.4 MILLIGRAM(S): 0.4 CAPSULE ORAL at 21:39

## 2018-01-17 RX ADMIN — Medication 325 MILLIGRAM(S): at 12:29

## 2018-01-17 RX ADMIN — Medication 25 MILLIGRAM(S): at 06:42

## 2018-01-17 RX ADMIN — SENNA PLUS 2 TABLET(S): 8.6 TABLET ORAL at 21:39

## 2018-01-17 RX ADMIN — Medication 81 MILLIGRAM(S): at 12:29

## 2018-01-17 RX ADMIN — GABAPENTIN 300 MILLIGRAM(S): 400 CAPSULE ORAL at 21:39

## 2018-01-17 RX ADMIN — Medication 112 MICROGRAM(S): at 06:42

## 2018-01-17 RX ADMIN — TRAMADOL HYDROCHLORIDE 50 MILLIGRAM(S): 50 TABLET ORAL at 12:30

## 2018-01-17 NOTE — PROGRESS NOTE ADULT - SUBJECTIVE AND OBJECTIVE BOX
CARDIOLOGY     no palpitations, no syncope, no angina      MEDICATIONS  (STANDING):  ascorbic acid 500 milliGRAM(s) Oral daily  aspirin enteric coated 81 milliGRAM(s) Oral daily  clopidogrel Tablet 75 milliGRAM(s) Oral daily  ferrous    sulfate 325 milliGRAM(s) Oral daily  gabapentin 300 milliGRAM(s) Oral every 8 hours  heparin  Injectable 5000 Unit(s) SubCutaneous every 8 hours  hydrochlorothiazide 25 milliGRAM(s) Oral daily  levothyroxine 112 MICROGram(s) Oral daily  losartan 50 milliGRAM(s) Oral daily  metoprolol succinate ER 50 milliGRAM(s) Oral daily  senna 2 Tablet(s) Oral at bedtime  simvastatin 40 milliGRAM(s) Oral at bedtime  tamsulosin 0.4 milliGRAM(s) Oral at bedtime  traMADol 50 milliGRAM(s) Oral daily    LABS:                        12.8   10.44 )-----------( 235      ( 17 Jan 2018 07:05 )             41.0   141  |  98  |  13  ----------------------------<  89  3.8   |  32<H>  |  0.52    Ca    9.2      17 Jan 2018 07:05     PHYSICAL EXAM  Vital Signs Last 24 Hrs  T(C): 36.5 (17 Jan 2018 05:05), Max: 36.7 (16 Jan 2018 12:55)  T(F): 97.7 (17 Jan 2018 05:05), Max: 98 (16 Jan 2018 12:55)  HR: 79 (17 Jan 2018 05:05) (76 - 79)  BP: 126/79 (17 Jan 2018 05:05) (107/71 - 126/79)  RR: 18 (17 Jan 2018 05:05) (18 - 19)  SpO2: 96% (17 Jan 2018 05:05) (96% - 99%)      no JVD  RRR, no murmurs  CTAB  soft nt/nd  no c/c/e    < from: Transthoracic Echocardiogram (01.15.18 @ 11:09) >  CONCLUSIONS:  1. An annuloplasty ring is seen in the mitral position.  Mean transmitral valve gradient equals 4 mm Hg, which is  probably normal in the setting of a MV repair.  2. Calcified trileaflet aortic valve with normal opening.  Mild aortic regurgitation.  3. Normal left ventricular systolic function. No segmental  wall motion abnormalities.  4. Normal right ventricular size and function.  *** No previous Echo exam.  ------------------------------------------------------------------------  Confirmed on  1/15/2018 - 11:59:06 by Stephanie Wood MD    < end of copied text >    cath report pending    A/P) 66 y/o male PMH HTN, hyperlipidemia, DM, CAD s/p SONIA to Lcx and LPDA (June 2017) at [facility] (Dr Montiel), and MV repair being worked up for spinal stenosis surgery. RCRI score is already 2, making him intermediate risk for intermediate risk surgery.     -Cath report reviewed. Pt had Promus SONIA placed to the LCX and LPDA on June 23, 2017.  Resume Plavix.  Pt should be on DAPT for 1 year following SONIA.  -Patient understands and agrees to resume Asa/Plavix  -No surgery planned at this time  -DC planning to [place]  -upon dc f/u with Dr Randall for cardio [phone]/ [phone]      Kerry Siddiqui PA-C  [facility]  [address]   [place] [address]  office [phone]  pager [phone]

## 2018-01-17 NOTE — PROGRESS NOTE ADULT - SUBJECTIVE AND OBJECTIVE BOX
Patient is a 67y old  Male who presents with a chief complaint of couldn't walk or stand, weakness. (16 Jan 2018 23:07)      SUBJECTIVE / OVERNIGHT EVENTS:  Pt seen and examined at bedside. Feels well. Pain is okay on Tylenol.  Tramadol helps.  No chest pain, no shortness of breath.   No overnight event.   No N/V/D. No abdominal pain. +BM three days ago.         Vital Signs Last 24 Hrs  T(C): 36.5 (17 Jan 2018 05:05), Max: 36.7 (16 Jan 2018 12:55)  T(F): 97.7 (17 Jan 2018 05:05), Max: 98 (16 Jan 2018 12:55)  HR: 79 (17 Jan 2018 05:05) (76 - 79)  BP: 126/79 (17 Jan 2018 05:05) (107/71 - 126/79)  BP(mean): --  RR: 18 (17 Jan 2018 05:05) (18 - 19)  SpO2: 96% (17 Jan 2018 05:05) (96% - 99%)  I&O's Summary      PHYSICAL EXAM:  GENERAL: NAD, Comfortable  HEAD:  Atraumatic, Normocephalic  EYES: EOMI, PERRLA, conjunctiva and sclera clear  NECK: Supple, No JVD  CHEST/LUNG: Clear to auscultation bilaterally; No wheeze  HEART: Regular rate and rhythm; No murmurs, rubs, or gallops  ABDOMEN: Soft, Nontender, Nondistended; Bowel sounds present  Neuro: AAO x 3, no focal deficit, 5/5 b/l extremities  EXTREMITIES:  2+ Peripheral Pulses, No clubbing, cyanosis, or edema  SKIN: No rashes or lesions    LABS:                        12.8   10.44 )-----------( 235      ( 17 Jan 2018 07:05 )             41.0     01-17    141  |  98  |  13  ----------------------------<  89  3.8   |  32<H>  |  0.52    Ca    9.2      17 Jan 2018 07:05        CAPILLARY BLOOD GLUCOSE      POCT Blood Glucose.: 92 mg/dL (17 Jan 2018 08:43)  POCT Blood Glucose.: 118 mg/dL (16 Jan 2018 21:38)  POCT Blood Glucose.: 102 mg/dL (16 Jan 2018 18:05)  POCT Blood Glucose.: 119 mg/dL (16 Jan 2018 12:36)            RADIOLOGY & ADDITIONAL TESTS:    Imaging Personally Reviewed:  [x] YES  [ ] NO    Consultant(s) Notes Reviewed:  [x] YES  [ ] NO      MEDICATIONS  (STANDING):  ascorbic acid 500 milliGRAM(s) Oral daily  aspirin enteric coated 81 milliGRAM(s) Oral daily  clopidogrel Tablet 75 milliGRAM(s) Oral daily  ferrous    sulfate 325 milliGRAM(s) Oral daily  gabapentin 300 milliGRAM(s) Oral every 8 hours  heparin  Injectable 5000 Unit(s) SubCutaneous every 8 hours  hydrochlorothiazide 25 milliGRAM(s) Oral daily  levothyroxine 112 MICROGram(s) Oral daily  losartan 50 milliGRAM(s) Oral daily  metoprolol succinate ER 50 milliGRAM(s) Oral daily  senna 2 Tablet(s) Oral at bedtime  simvastatin 40 milliGRAM(s) Oral at bedtime  tamsulosin 0.4 milliGRAM(s) Oral at bedtime  traMADol 50 milliGRAM(s) Oral daily    MEDICATIONS  (PRN):  acetaminophen   Tablet. 650 milliGRAM(s) Oral every 6 hours PRN Mild Pain -and moderate pain  bisacodyl 5 milliGRAM(s) Oral every 12 hours PRN Constipation      Care Discussed with Consultants/Other Providers [x] YES  [ ] NO    HEALTH ISSUES - PROBLEM Dx:  Prophylactic measure: Prophylactic measure  BPH (benign prostatic hyperplasia): BPH (benign prostatic hyperplasia)  Acquired hypothyroidism: Acquired hypothyroidism  Type 2 diabetes mellitus without complication, without long-term current use of insulin: Type 2 diabetes mellitus without complication, without long-term current use of insulin  HTN (hypertension): HTN (hypertension)  Coronary artery disease involving native coronary artery of native heart without angina pectoris: Coronary artery disease involving native coronary artery of native heart without angina pectoris  Spinal stenosis of lumbar region: Spinal stenosis of lumbar region

## 2018-01-17 NOTE — ADVANCED PRACTICE NURSE CONSULT - RECOMMEDATIONS
Topical recommendations:  Left lower leg with multiple open scabs: gently cleanse with NS. Pat dry. Apply Liquid barrier film to periwound skin. Apply Silver silicone foam without border. Secure with Size 8 Spandage tubular stretch netting. Change every 72 hours.    Please contact Wound Care Service Line if we can be of further assistance (ext [phone]).

## 2018-01-17 NOTE — ADVANCED PRACTICE NURSE CONSULT - REASON FOR CONSULT
Patient known to [facility] service line, evaluated on 1/11/2018 for Left shin, Right scapula, and sacrum healing stage 2 pressure injury. Consulted today for re-evaluation of left lower leg open scabs.

## 2018-01-17 NOTE — ADVANCED PRACTICE NURSE CONSULT - ASSESSMENT
Left lower leg multiple open scabs noted:  ·	Left shin- 4npq4cyl2.2cm- irregular border, base is 50% deep red, friable exposed dermis, 50% pink-flat moist exposed dermis. (+) tender to touch, 8/10 pain with palpation. Periwound skin with blanchable erythema extending 0.5cm from wound edge, maceration of intact epidermis from 6-9o'clock along wound edge. No increased warmth, no induration, no edema noted.  ·	Left lateral lower leg- 1.3rbq7cpa1.2cm- irregular borders base 100% pink moist, flat exposed dermis, (+) scant serous drainage, no odor. Periwound skin with blanchable erythema extending 0.3cm from wound edge. No increased warmth, no induration, no edema noted.  ·	Left lateral knee-3.3kzc7mff6.2cm- 3.6ysd5ovh0.2- irregular borders base 100% pink moist, flat exposed dermis, (+) scant serous drainage, no odor. Periwound skin with hypopigmentation and hyperpigmentation. No erythema, no increased warmth, no induration, no edema noted.  ·	Left medial knee- 4mnp4wll3.2cm- 3wtl4whh0.2cm-  irregular borders base 100% pink moist, flat exposed dermis, (+) scant serous drainage, no odor. Periwound skin with hypopigmentation and hyperpigmentation. No erythema, no increased warmth, no induration, no edema noted.    Goal of care: maintain moist environment to promote wound healing, decrease bioburden, protect periwound skin, atraumatic application and removal.
General: A&O x 4, currently bedbound, continent of urine and stool. Skin warm, dry with increased moisture in intertriginous folds, adequate skin turgor, scattered areas of hyperpigmentation and hypopigmentation. Left upper chest wall linear scar, right scapula surgical scar with hypopigmentation and keloid with stable scab measuring 0.5cmx0.0iuf8kf. Left anterior lower leg with multiple intact stable scabs secondary to fall (patient reports pain while palpating scabs or when touched by garments/sheets etc) largest scab on anterior shin measures 0frk0aqn3.2cm, 100% dry crust, periwound skin intact. Right achilles with hypopigmentation and soft tissue contraction (patient states previously site of diabetic wound followed by outpatient Podiatrist with successful healing). Bilateral heels with blanchable erythema.    Sacrum extending to bilateral buttocks- healing stage 2 pressure injury complicated by moisture associated dermatitis- patient lying on right side for assessment- area of hypopigmentation in sacral fold extending to inner bilateral buttocks, given appearance and patient report healing stage 2 pressure injury; no evidence of soft tissue contraction noted, no palpable scar tissue. Linear fissure within sacral fold measuring 1.5cmx0.3cmx0.2cm exposing pink moist dermal base. Small serous drainage, no odor. Edges are macerated periwound skin with hypopigmentation surrounded by hyperpigmentation, chronic skin changes. Goals of care: absorb/manage drainage

## 2018-01-18 LAB
GLUCOSE BLDC GLUCOMTR-MCNC: 116 MG/DL — HIGH (ref 70–99)
GLUCOSE BLDC GLUCOMTR-MCNC: 132 MG/DL — HIGH (ref 70–99)
GLUCOSE BLDC GLUCOMTR-MCNC: 195 MG/DL — HIGH (ref 70–99)
GLUCOSE BLDC GLUCOMTR-MCNC: 88 MG/DL — SIGNIFICANT CHANGE UP (ref 70–99)
GLUCOSE BLDC GLUCOMTR-MCNC: 93 MG/DL — SIGNIFICANT CHANGE UP (ref 70–99)

## 2018-01-18 RX ORDER — METOPROLOL TARTRATE 50 MG
1 TABLET ORAL
Qty: 0 | Refills: 0 | COMMUNITY
Start: 2018-01-18

## 2018-01-18 RX ORDER — POLYETHYLENE GLYCOL 3350 17 G/17G
17 POWDER, FOR SOLUTION ORAL
Qty: 0 | Refills: 0 | COMMUNITY
Start: 2018-01-18

## 2018-01-18 RX ORDER — LEVOTHYROXINE SODIUM 125 MCG
1 TABLET ORAL
Qty: 0 | Refills: 0 | COMMUNITY
Start: 2018-01-18

## 2018-01-18 RX ORDER — SODIUM CHLORIDE 0.65 %
1 AEROSOL, SPRAY (ML) NASAL
Qty: 0 | Refills: 0 | Status: DISCONTINUED | OUTPATIENT
Start: 2018-01-18 | End: 2018-01-19

## 2018-01-18 RX ORDER — TRAMADOL HYDROCHLORIDE 50 MG/1
50 TABLET ORAL
Qty: 0 | Refills: 0 | Status: DISCONTINUED | OUTPATIENT
Start: 2018-01-18 | End: 2018-01-19

## 2018-01-18 RX ADMIN — Medication 650 MILLIGRAM(S): at 07:45

## 2018-01-18 RX ADMIN — Medication 81 MILLIGRAM(S): at 12:29

## 2018-01-18 RX ADMIN — GABAPENTIN 300 MILLIGRAM(S): 400 CAPSULE ORAL at 06:47

## 2018-01-18 RX ADMIN — Medication 650 MILLIGRAM(S): at 07:05

## 2018-01-18 RX ADMIN — TRAMADOL HYDROCHLORIDE 50 MILLIGRAM(S): 50 TABLET ORAL at 12:29

## 2018-01-18 RX ADMIN — TRAMADOL HYDROCHLORIDE 50 MILLIGRAM(S): 50 TABLET ORAL at 21:12

## 2018-01-18 RX ADMIN — SENNA PLUS 2 TABLET(S): 8.6 TABLET ORAL at 21:12

## 2018-01-18 RX ADMIN — SIMVASTATIN 40 MILLIGRAM(S): 20 TABLET, FILM COATED ORAL at 21:12

## 2018-01-18 RX ADMIN — Medication 500 MILLIGRAM(S): at 12:29

## 2018-01-18 RX ADMIN — LOSARTAN POTASSIUM 50 MILLIGRAM(S): 100 TABLET, FILM COATED ORAL at 06:47

## 2018-01-18 RX ADMIN — Medication 112 MICROGRAM(S): at 06:47

## 2018-01-18 RX ADMIN — Medication 50 MILLIGRAM(S): at 06:47

## 2018-01-18 RX ADMIN — TRAMADOL HYDROCHLORIDE 50 MILLIGRAM(S): 50 TABLET ORAL at 22:12

## 2018-01-18 RX ADMIN — Medication 325 MILLIGRAM(S): at 12:29

## 2018-01-18 RX ADMIN — Medication 650 MILLIGRAM(S): at 17:00

## 2018-01-18 RX ADMIN — HEPARIN SODIUM 5000 UNIT(S): 5000 INJECTION INTRAVENOUS; SUBCUTANEOUS at 21:13

## 2018-01-18 RX ADMIN — GABAPENTIN 300 MILLIGRAM(S): 400 CAPSULE ORAL at 21:12

## 2018-01-18 RX ADMIN — Medication 5 MILLIGRAM(S): at 21:12

## 2018-01-18 RX ADMIN — Medication 650 MILLIGRAM(S): at 18:00

## 2018-01-18 RX ADMIN — CLOPIDOGREL BISULFATE 75 MILLIGRAM(S): 75 TABLET, FILM COATED ORAL at 12:29

## 2018-01-18 RX ADMIN — TAMSULOSIN HYDROCHLORIDE 0.4 MILLIGRAM(S): 0.4 CAPSULE ORAL at 21:12

## 2018-01-18 RX ADMIN — Medication 25 MILLIGRAM(S): at 06:47

## 2018-01-18 RX ADMIN — HEPARIN SODIUM 5000 UNIT(S): 5000 INJECTION INTRAVENOUS; SUBCUTANEOUS at 15:40

## 2018-01-18 RX ADMIN — TRAMADOL HYDROCHLORIDE 50 MILLIGRAM(S): 50 TABLET ORAL at 12:30

## 2018-01-18 RX ADMIN — GABAPENTIN 300 MILLIGRAM(S): 400 CAPSULE ORAL at 15:40

## 2018-01-18 NOTE — PROGRESS NOTE ADULT - SUBJECTIVE AND OBJECTIVE BOX
CARDIOLOGY     no palpitations, no syncope, no angina    MEDICATIONS  (STANDING):  ascorbic acid 500 milliGRAM(s) Oral daily  aspirin enteric coated 81 milliGRAM(s) Oral daily  clopidogrel Tablet 75 milliGRAM(s) Oral daily  ferrous    sulfate 325 milliGRAM(s) Oral daily  gabapentin 300 milliGRAM(s) Oral every 8 hours  heparin  Injectable 5000 Unit(s) SubCutaneous every 8 hours  hydrochlorothiazide 25 milliGRAM(s) Oral daily  levothyroxine 112 MICROGram(s) Oral daily  losartan 50 milliGRAM(s) Oral daily  metoprolol succinate ER 50 milliGRAM(s) Oral daily  senna 2 Tablet(s) Oral at bedtime  simvastatin 40 milliGRAM(s) Oral at bedtime  tamsulosin 0.4 milliGRAM(s) Oral at bedtime  traMADol 50 milliGRAM(s) Oral two times a day    LABS:                        12.8   10.44 )-----------( 235      ( 17 Jan 2018 07:05 )             41.0     141  |  98  |  13  ----------------------------<  89  3.8   |  32<H>  |  0.52    Ca    9.2      17 Jan 2018 07:05    PHYSICAL EXAM  Vital Signs Last 24 Hrs  T(C): 36.6 (18 Jan 2018 13:12), Max: 36.6 (17 Jan 2018 21:56)  T(F): 97.8 (18 Jan 2018 13:12), Max: 97.8 (17 Jan 2018 21:56)  HR: 85 (18 Jan 2018 13:12) (68 - 85)  BP: 112/65 (18 Jan 2018 13:12) (112/65 - 153/87)  RR: 18 (18 Jan 2018 06:13) (18 - 18)  SpO2: 99% (18 Jan 2018 13:12) (97% - 99%)    no JVD  RRR, no murmurs  CTAB  soft nt/nd  no c/c/e    < from: Transthoracic Echocardiogram (01.15.18 @ 11:09) >  CONCLUSIONS:  1. An annuloplasty ring is seen in the mitral position.  Mean transmitral valve gradient equals 4 mm Hg, which is  probably normal in the setting of a MV repair.  2. Calcified trileaflet aortic valve with normal opening.  Mild aortic regurgitation.  3. Normal left ventricular systolic function. No segmental  wall motion abnormalities.  4. Normal right ventricular size and function.  *** No previous Echo exam.  ------------------------------------------------------------------------  Confirmed on  1/15/2018 - 11:59:06 by Stephanie Wood MD    < end of copied text >    cath report pending    A/P) 68 y/o male PMH HTN, hyperlipidemia, DM, CAD s/p SONIA to Lcx and LPDA (June 2017) at [facility] (Dr Montiel), and MV repair being worked up for spinal stenosis surgery. RCRI score is already 2, making him intermediate risk for intermediate risk surgery.     -Cath report reviewed. Pt had Promus SONIA placed to the LCX and LPDA on June 23, 2017.  Resume Plavix.  Pt should be on DAPT for 1 year following SONIA.  -Patient understands and agrees to resume Asa/Plavix  -No surgery planned at this time  -DC planning to [facility]  -upon dc f/u with Dr Randall for cardio [phone]/ [phone]      Kerry Siddiqui PA-C  [facility]  [address]   [place] [address]  office [phone]  pager [phone]

## 2018-01-18 NOTE — PROGRESS NOTE ADULT - SUBJECTIVE AND OBJECTIVE BOX
Patient is a 67y old  Male who presents with a chief complaint of couldn't walk or stand, weakness. (16 Jan 2018 23:07)      SUBJECTIVE / OVERNIGHT EVENTS:  Pt seen and examined at bedside.   No overnight event. Pain is better.   Eating lunch. no cp, no sob, no n/v/d.   Requesting Tramadol to be switched to BID.   + BM.  + nasal congestions. afebrile, no cough. Wants nasal saline spray.     Vital Signs Last 24 Hrs  T(C): 36.6 (18 Jan 2018 13:12), Max: 36.6 (17 Jan 2018 21:56)  T(F): 97.8 (18 Jan 2018 13:12), Max: 97.8 (17 Jan 2018 21:56)  HR: 85 (18 Jan 2018 13:12) (68 - 85)  BP: 112/65 (18 Jan 2018 13:12) (112/65 - 153/87)  BP(mean): --  RR: 18 (18 Jan 2018 06:13) (18 - 18)  SpO2: 99% (18 Jan 2018 13:12) (97% - 99%)  I&O's Summary      PHYSICAL EXAM:  GENERAL: NAD, Comfortable  HEAD:  Atraumatic, Normocephalic  EYES: EOMI, PERRLA, conjunctiva and sclera clear  NECK: Supple, No JVD  CHEST/LUNG: Clear to auscultation bilaterally; No wheeze  HEART: Regular rate and rhythm; No murmurs, rubs, or gallops  ABDOMEN: Soft, Nontender, Nondistended; Bowel sounds present  Neuro: AAO x 3, no focal deficit, 5/5 b/l extremities  EXTREMITIES:  2+ Peripheral Pulses, No clubbing, cyanosis, or edema  SKIN: No rashes or lesions    LABS:                        12.8   10.44 )-----------( 235      ( 17 Jan 2018 07:05 )             41.0     01-17    141  |  98  |  13  ----------------------------<  89  3.8   |  32<H>  |  0.52    Ca    9.2      17 Jan 2018 07:05        CAPILLARY BLOOD GLUCOSE      POCT Blood Glucose.: 195 mg/dL (18 Jan 2018 12:37)  POCT Blood Glucose.: 88 mg/dL (18 Jan 2018 08:30)  POCT Blood Glucose.: 106 mg/dL (17 Jan 2018 22:04)  POCT Blood Glucose.: 91 mg/dL (17 Jan 2018 17:58)            RADIOLOGY & ADDITIONAL TESTS:    Imaging Personally Reviewed:  [x] YES  [ ] NO    Consultant(s) Notes Reviewed:  [x] YES  [ ] NO      MEDICATIONS  (STANDING):  ascorbic acid 500 milliGRAM(s) Oral daily  aspirin enteric coated 81 milliGRAM(s) Oral daily  clopidogrel Tablet 75 milliGRAM(s) Oral daily  ferrous    sulfate 325 milliGRAM(s) Oral daily  gabapentin 300 milliGRAM(s) Oral every 8 hours  heparin  Injectable 5000 Unit(s) SubCutaneous every 8 hours  hydrochlorothiazide 25 milliGRAM(s) Oral daily  levothyroxine 112 MICROGram(s) Oral daily  losartan 50 milliGRAM(s) Oral daily  metoprolol succinate ER 50 milliGRAM(s) Oral daily  senna 2 Tablet(s) Oral at bedtime  simvastatin 40 milliGRAM(s) Oral at bedtime  tamsulosin 0.4 milliGRAM(s) Oral at bedtime  traMADol 50 milliGRAM(s) Oral two times a day    MEDICATIONS  (PRN):  acetaminophen   Tablet. 650 milliGRAM(s) Oral every 6 hours PRN Mild Pain -and moderate pain  bisacodyl 5 milliGRAM(s) Oral every 12 hours PRN Constipation  polyethylene glycol 3350 17 Gram(s) Oral daily PRN Constipation  sodium chloride 0.65% Nasal 1 Spray(s) Both Nostrils two times a day PRN Nasal Congestion      Care Discussed with Consultants/Other Providers [x] YES  [ ] NO    HEALTH ISSUES - PROBLEM Dx:  Prophylactic measure: Prophylactic measure  BPH (benign prostatic hyperplasia): BPH (benign prostatic hyperplasia)  Acquired hypothyroidism: Acquired hypothyroidism  Type 2 diabetes mellitus without complication, without long-term current use of insulin: Type 2 diabetes mellitus without complication, without long-term current use of insulin  HTN (hypertension): HTN (hypertension)  Coronary artery disease involving native coronary artery of native heart without angina pectoris: Coronary artery disease involving native coronary artery of native heart without angina pectoris  Spinal stenosis of lumbar region: Spinal stenosis of lumbar region

## 2018-01-19 VITALS
RESPIRATION RATE: 19 BRPM | OXYGEN SATURATION: 98 % | TEMPERATURE: 98 F | HEART RATE: 79 BPM | SYSTOLIC BLOOD PRESSURE: 108 MMHG | DIASTOLIC BLOOD PRESSURE: 68 MMHG

## 2018-01-19 LAB
GLUCOSE BLDC GLUCOMTR-MCNC: 103 MG/DL — HIGH (ref 70–99)
GLUCOSE BLDC GLUCOMTR-MCNC: 129 MG/DL — HIGH (ref 70–99)
GLUCOSE BLDC GLUCOMTR-MCNC: 89 MG/DL — SIGNIFICANT CHANGE UP (ref 70–99)

## 2018-01-19 RX ORDER — LEVOTHYROXINE SODIUM 125 MCG
1 TABLET ORAL
Qty: 0 | Refills: 0 | COMMUNITY

## 2018-01-19 RX ORDER — METOPROLOL TARTRATE 50 MG
1 TABLET ORAL
Qty: 0 | Refills: 0 | COMMUNITY

## 2018-01-19 RX ORDER — METFORMIN HYDROCHLORIDE 850 MG/1
1 TABLET ORAL
Qty: 0 | Refills: 0 | COMMUNITY

## 2018-01-19 RX ADMIN — HEPARIN SODIUM 5000 UNIT(S): 5000 INJECTION INTRAVENOUS; SUBCUTANEOUS at 13:15

## 2018-01-19 RX ADMIN — TRAMADOL HYDROCHLORIDE 50 MILLIGRAM(S): 50 TABLET ORAL at 14:13

## 2018-01-19 RX ADMIN — LOSARTAN POTASSIUM 50 MILLIGRAM(S): 100 TABLET, FILM COATED ORAL at 06:30

## 2018-01-19 RX ADMIN — HEPARIN SODIUM 5000 UNIT(S): 5000 INJECTION INTRAVENOUS; SUBCUTANEOUS at 06:30

## 2018-01-19 RX ADMIN — Medication 50 MILLIGRAM(S): at 06:30

## 2018-01-19 RX ADMIN — Medication 500 MILLIGRAM(S): at 13:13

## 2018-01-19 RX ADMIN — CLOPIDOGREL BISULFATE 75 MILLIGRAM(S): 75 TABLET, FILM COATED ORAL at 13:13

## 2018-01-19 RX ADMIN — TRAMADOL HYDROCHLORIDE 50 MILLIGRAM(S): 50 TABLET ORAL at 13:13

## 2018-01-19 RX ADMIN — Medication 325 MILLIGRAM(S): at 13:15

## 2018-01-19 RX ADMIN — Medication 81 MILLIGRAM(S): at 13:13

## 2018-01-19 RX ADMIN — GABAPENTIN 300 MILLIGRAM(S): 400 CAPSULE ORAL at 06:30

## 2018-01-19 RX ADMIN — Medication 112 MICROGRAM(S): at 06:30

## 2018-01-19 RX ADMIN — Medication 25 MILLIGRAM(S): at 06:30

## 2018-01-19 RX ADMIN — GABAPENTIN 300 MILLIGRAM(S): 400 CAPSULE ORAL at 13:13

## 2018-01-19 NOTE — PROGRESS NOTE ADULT - PROVIDER SPECIALTY LIST ADULT
Cardiology
Internal Medicine
Orthopedics
Internal Medicine

## 2018-01-19 NOTE — PROGRESS NOTE ADULT - ATTENDING COMMENTS
Agree with above.   -No surgery being planned  -continue with asa and plavix for history of jaime if no contraindications    Darrick Melgoza MD  [facility]  [address], [address]  [place] [address]  office: [phone]  pager: [phone]
Agree with above.  -No surgery being planned  -continue with dual antiplatelet therapy for history of jaime    Darrick Melgoza MD  [facility]  [address], [address]  [place] [address]  office: [phone]  pager: [phone]
Patient seen and examined.  Agree with above.   -Continue with asa  -obtain cath report  -if SONIA used, recommend dapt     Darrick Melgoza MD  [facility]  [address], [address]  [place] [address]  office: [phone]  pager: [phone]
Patient seen and examined.  Agree with above.   -Continue with asa and plavix for history of jaime within 12 months  -f/u tray Melgoza MD  [facility]  [address] [place] [address], [address] [place] [address]  office: [phone]  pager: [phone]
Patient seen and examined.  Agree with above.   -Recommend at least asa 81 mg PO daily for recent PCI.  If patient had SONIA placement, recommend dual antiplt therapy.   -Pt. has been refusing ASA;  I explained all the risks of refusing asa including possible stent thrombosis and death.  He understand all risks and benefits and continues to refuse.   -f/u orthopedic surgery  -obtain old cath report    Darrick Melgoza MD  [facility]  [address] [place] [address], [address]  [place] [address]  office: [phone]  pager: [phone]
Pt seen and examined and I agree with above PA note. Pt known to me, seen in my office previously. Now admitted with BL LE radic and difficulty ambulating. Pt has fallen. On exam, 4+/5 B TA/EHL. MRI with severe L3-5 stenosis. We discussed the treatment options for him including meds, PT, alternative therapies, JULITA, and surgery. He has failed multiple nonoperative modalities. He will speak with his wife and will consider proceeding with surgery if medically feasible. If not clearable at this time, pt can f/u as outpt for scheduling. Remain available.     Odin Trejo MD  Spinal Surgery  [facility], [address]  [place] [address]  P [phone]
CARDIOLOGY ATTENDING    Agree with above. Given PCI in June 2017 would continue at least ASA. Will get official cath report (BMS vs SONIA) to then recommend if plavix is also needed. Get echo. RCRI score is already 2, making him intermediate risk for intermediate risk surgery.
Rimma Hoskins MD ( [facility])  [phone]
Rimma Hoskins MD ( [facility])  [phone]
- Dr. EDWARD Hewitt ([facility])  - [phone]
- Dr. EDWARD Hewitt ([facility])  - [phone]
- Dr. EDWARD Hewitt ([facility])  - [phone]
Rimma Hoskins MD ( [facility])  [phone]
Rimma Hoskins MD ( [facility])  [phone]
Rimma Hoskins MD ( [facility])  [phone]
Rimma Hoskins MD ( [facility])  [phone]

## 2018-01-19 NOTE — PROGRESS NOTE ADULT - PROBLEM SELECTOR PROBLEM 5
Acquired hypothyroidism

## 2018-01-19 NOTE — PROGRESS NOTE ADULT - PROBLEM SELECTOR PLAN 1
Known spinal stenosis of lumbar region which has been chronically giving him back pain and LE weakness.   Surgery postponed for now given recent SONIA in 6/1017, on DAPT (Asa/Plavix)  - Pain control PRN   - MRI-severe spinal stenosis noted  - Orthopedic f/u appreciated.  - PT consult  - Plan for d/c planning to rehab.  - plan for surgery outpt once ready from card perspective.   -  can increased Tramadol to BID PRN if needed.   - c/w stool softeners.
Known spinal stenosis of lumbar region which has been chronically giving him back pain and LE weakness.   Surgery postponed for now given recent SONIA in 6/1017, on DAPT (Asa/Plavix)  - Pain control PRN   - MRI-severe spinal stenosis noted  - Orthopedic f/u appreciated.  - PT consult  - Plan for d/c planning to rehab.  - plan for surgery outpt once ready from card perspective.   -  Increase Tramadol to BID  - c/w stool softeners.
Known spinal stenosis of lumbar region which has been chronically giving him back pain and LE weakness.   Surgery postponed for now given recent SONIA in 6/1017, on DAPT (Asa/Plavix)  - Pain control PRN   - MRI-severe spinal stenosis noted  - Orthopedic f/u appreciated.  - PT consult  - plan for surgery outpt once ready from card perspective.   - c/w Tramadol to BID  - c/w stool softeners.  - d/c planning to rehab once placement is obtained.
Patient says he has known spinal stenosis of lumbar region which has been chronically giving him back pain and LE weakness. Was supposed to have surgical intervention in 2017, but due to acute finding of CAD, he has had to be on DAPT for 6 months and therefore had put off his surgery. His stents were placed in June 2017, so patient by now should begin process of evaluation possibly addressing spinal stenosis of his lumbar spine  - Pain control PRN   - MRI-severe spinal stenosis noted  Orthopedic Consultation   - PT consult
Patient says he has known spinal stenosis of lumbar region which has been chronically giving him back pain and LE weakness. Was supposed to have surgical intervention in 2017, but due to acute finding of CAD, he has had to be on DAPT for 6 months and therefore had put off his surgery. His stents were placed in June 2017, so patient by now should begin process of evaluation possibly addressing spinal stenosis of his lumbar spine  - Pain control PRN   - MRI-severe spinal stenosis noted  Orthopedic Consultation -plan for Sx inpt  cards c/s noted for clearence  - PT consult
Patient says he has known spinal stenosis of lumbar region which has been chronically giving him back pain and LE weakness. Was supposed to have surgical intervention in 2017, but due to acute finding of CAD, he has had to be on DAPT for 6 months and therefore had put off his surgery. His stents were placed in June 2017, so patient by now should begin process of evaluation possibly addressing spinal stenosis of his lumbar spine  - Pain control PRN   - MRI-severe spinal stenosis noted  Orthopedic Consultation -plan for Sx inpt  cards c/s noted for clearence  - PT consult
Patient says he has known spinal stenosis of lumbar region which has been chronically giving him back pain and LE weakness. Was supposed to have surgical intervention in 2017, but due to acute finding of CAD, he has had to be on DAPT for 6 months and therefore had put off his surgery. His stents were placed in June 2017, so patient by now should begin process of evaluation possibly addressing spinal stenosis of his lumbar spine  - Pain control PRN   - MRI-severe spinal stenosis noted  Orthopedic Consultation -plan for Sx pending cards clearence  - PT consult
Patient says he has known spinal stenosis of lumbar region which has been chronically giving him back pain and LE weakness. Was supposed to have surgical intervention in 2017, but due to acute finding of CAD, he has had to be on DAPT for 6 months and therefore had put off his surgery. His stents were placed in June 2017, so patient by now should begin process of evaluation possibly addressing spinal stenosis of his lumbar spine  - Pain control PRN   - MRI-severe spinal stenosis noted  Orthopedic Consultation -plan for Sx pending cards clearence  - PT consult
Patient says he has known spinal stenosis of lumbar region which has been chronically giving him back pain and LE weakness. Was supposed to have surgical intervention in 2017, but due to acute finding of CAD, he has had to be on DAPT for 6 months and therefore had put off his surgery. His stents were placed in June 2017, so patient by now should begin process of evaluation possibly addressing spinal stenosis of his lumbar spine  - Pain control PRN   - MRI-severe spinal stenosis noted  Orthopedic Consultation -plan for Sx inpt  cards c/s noted for clearence  - PT consult

## 2018-01-19 NOTE — PROGRESS NOTE ADULT - PROBLEM SELECTOR PLAN 5
c/w levothyroxine as previously prescribed

## 2018-01-19 NOTE — PROGRESS NOTE ADULT - PROBLEM SELECTOR PROBLEM 4
Type 2 diabetes mellitus without complication, without long-term current use of insulin

## 2018-01-19 NOTE — PROGRESS NOTE ADULT - PROBLEM SELECTOR PLAN 3
c/w Losartan and Hctz

## 2018-01-19 NOTE — PROGRESS NOTE ADULT - PROBLEM SELECTOR PLAN 2
Zocor, and Metoprolol  restart Asa/Plavix since no plan for surgery at the moment.
, Zocor, and Metoprolol  cards c/s-ppreop clearence- appreciated  cont asa, hold off on plavix for now
, Zocor, and Metoprolol  cards c/s-ppreop clearence- appreciated  cont asa, hold off on plavix for now
- C/w DAPT, Zocor, and Metoprolol
- C/w DAPT, Zocor, and Metoprolol  cards c/s-ppreop clearence
Zocor, and Metoprolol  restart Asa/Plavix since no plan for surgery at the moment.
Zocor, and Metoprolol  restart Asa/Plavix since no plan for surgery at the moment.
- C/w DAPT, Zocor, and Metoprolol  cards c/s-ppreop clearence
, Zocor, and Metoprolol  cards c/s-ppreop clearence- appreciated  cont asa, hold off on plavix for now

## 2018-01-19 NOTE — PROGRESS NOTE ADULT - SUBJECTIVE AND OBJECTIVE BOX
CARDIOLOGY     no palpitations, no syncope, no angina    MEDICATIONS  (STANDING):  ascorbic acid 500 milliGRAM(s) Oral daily  aspirin enteric coated 81 milliGRAM(s) Oral daily  clopidogrel Tablet 75 milliGRAM(s) Oral daily  ferrous    sulfate 325 milliGRAM(s) Oral daily  gabapentin 300 milliGRAM(s) Oral every 8 hours  heparin  Injectable 5000 Unit(s) SubCutaneous every 8 hours  hydrochlorothiazide 25 milliGRAM(s) Oral daily  levothyroxine 112 MICROGram(s) Oral daily  losartan 50 milliGRAM(s) Oral daily  metoprolol succinate ER 50 milliGRAM(s) Oral daily  senna 2 Tablet(s) Oral at bedtime  simvastatin 40 milliGRAM(s) Oral at bedtime  tamsulosin 0.4 milliGRAM(s) Oral at bedtime  traMADol 50 milliGRAM(s) Oral two times a day    MEDICATIONS  (PRN):  acetaminophen   Tablet. 650 milliGRAM(s) Oral every 6 hours PRN Mild Pain -and moderate pain  bisacodyl 5 milliGRAM(s) Oral every 12 hours PRN Constipation  polyethylene glycol 3350 17 Gram(s) Oral daily PRN Constipation  sodium chloride 0.65% Nasal 1 Spray(s) Both Nostrils two times a day PRN Nasal Congestion      LABS:    Hemoglobin: 12.8 g/dL (01-17 @ 07:05)  Hemoglobin: 12.5 g/dL (01-15 @ 04:55)      Creatinine Trend: 0.52<--, 0.53<--, 0.57<--, 0.57<--, 0.58<--      PHYSICAL EXAM  Vital Signs Last 24 Hrs  T(C): 36.8 (19 Jan 2018 05:40), Max: 36.8 (19 Jan 2018 05:40)  T(F): 98.3 (19 Jan 2018 05:40), Max: 98.3 (19 Jan 2018 05:40)  HR: 67 (19 Jan 2018 05:40) (67 - 85)  BP: 156/78 (19 Jan 2018 05:40) (112/65 - 156/78)  BP(mean): --  RR: 18 (19 Jan 2018 05:40) (18 - 18)  SpO2: 99% (19 Jan 2018 05:40) (99% - 99%)      no JVD  RRR, no murmurs  CTAB  soft nt/nd  no c/c/e    < from: Transthoracic Echocardiogram (01.15.18 @ 11:09) >  CONCLUSIONS:  1. An annuloplasty ring is seen in the mitral position.  Mean transmitral valve gradient equals 4 mm Hg, which is  probably normal in the setting of a MV repair.  2. Calcified trileaflet aortic valve with normal opening.  Mild aortic regurgitation.  3. Normal left ventricular systolic function. No segmental  wall motion abnormalities.  4. Normal right ventricular size and function.  *** No previous Echo exam.  ------------------------------------------------------------------------  Confirmed on  1/15/2018 - 11:59:06 by Stephanie Wood MD    < end of copied text >    cath report pending    A/P) 66 y/o male PMH HTN, hyperlipidemia, DM, CAD s/p SONIA to Lcx and LPDA (June 2017) at [facility] (Dr Montiel), and MV repair being worked up for spinal stenosis surgery. RCRI score is already 2, making him intermediate risk for intermediate risk surgery.     -Cath report reviewed. Pt had Promus SONIA placed to the LCX and LPDA on June 23, 2017.   Pt should be on DAPT for 1 year following SONIA.  -c/w Asa/Plavix  -No surgery planned at this time  -DC planning to [place]  -upon dc f/u with Dr Randall for cardio [phone]/ [phone]

## 2018-01-19 NOTE — PROGRESS NOTE ADULT - ASSESSMENT
67 M hx of CAD s/p 4 stents (6/2017), Mitral Valve Regurgitation s/p Repair (6/2017), Thalassemia, Cervical and Lumbar Spinal Stenosis (C3-C7 fusion performed 3/2013), Type 2 Diabetes, presents to [facility] ED for b/l LE weakness, left greater than right.
67 M hx of CAD s/p 4 stents (6/2017), Mitral Valve Regurgitation s/p Repair (6/2017), Thalassemia, Cervical and Lumbar Spinal Stenosis (C3-C7 fusion performed 3/2013), Type 2 Diabetes, presents to [facility] ED for b/l LE weakness, left greater than right.
67M hx of CAD s/p 4 stents (6/2017), Mitral Valve Regurgitation s/p Repair (6/2017), Thalassemia, Cervical and Lumbar Spinal Stenosis (C3-C7 fusion performed 3/2013), Type 2 Diabetes, presents to [facility] ED for b/l LE weakness, left greater than right.
67M hx of CAD s/p 4 stents (6/2017), Mitral Valve Regurgitation s/p Repair (6/2017), Thalassemia, Cervical and Lumbar Spinal Stenosis (C3-C7 fusion performed 3/2013), Type 2 Diabetes, presents to [facility] ED for b/l LE weakness, left greater than right.
67M hx of CAD s/p 4 stents (6/2017), Mitral Valve Regurgitation s/p Repair (6/2017), Thalassemia, Cervical and Lumbar Spinal Stenosis (C3-C7 fusion performed 3/2013), Type 2 Diabetes, presents to [facility] ED for b/l LE weakness, left greater than right.
67M hx of CAD s/p 4 stents (6/2017), Mitral Valve Regurgitation s/p Repair (6/2017), Thalassemia, Cervical and Lumbar Spinal Stenosis (C3-C7 fusion performed 3/2013), Type 2 Diabetes, presents to [facility] ED for b/l LE weakness, left greater than right.
67M hx of CAD s/p 4 stents (6/2017), Mitral Valve Regurgitation s/p Repair (6/2017), Thalassemia, Cervical and Lumbar Spinal Stenosis (C3-C7 fusion performed 3/2013), Type 2 Diabetes, presents to [facility] ED for b/l LE weakness, left greater than right.
67M hx of CAD s/p 4 stents (6/2017), Mitral Valve Regurgitation s/p Repair (6/2017), Thalassemia, Cervical and Lumbar Spinal Stenosis (C3-C7 fusion performed 3/2013), Type 2 Diabetes, presents to [facility] ED for b/l LE weakness, left greater than right.
67 M hx of CAD s/p 4 stents (6/2017), Mitral Valve Regurgitation s/p Repair (6/2017), Thalassemia, Cervical and Lumbar Spinal Stenosis (C3-C7 fusion performed 3/2013), Type 2 Diabetes, presents to [facility] ED for b/l LE weakness, left greater than right.

## 2018-01-19 NOTE — PROGRESS NOTE ADULT - PROBLEM SELECTOR PLAN 6
c/w flomax qHS

## 2018-01-19 NOTE — PROGRESS NOTE ADULT - SUBJECTIVE AND OBJECTIVE BOX
Patient is a 67y old  Male who presents with a chief complaint of couldn't walk or stand, weakness. (16 Jan 2018 23:07)      SUBJECTIVE / OVERNIGHT EVENTS:  Pain better control.  no complaints.  no overnight event.       Vital Signs Last 24 Hrs  T(C): 36.8 (19 Jan 2018 05:40), Max: 36.8 (19 Jan 2018 05:40)  T(F): 98.3 (19 Jan 2018 05:40), Max: 98.3 (19 Jan 2018 05:40)  HR: 67 (19 Jan 2018 05:40) (67 - 85)  BP: 156/78 (19 Jan 2018 05:40) (112/65 - 156/78)  BP(mean): --  RR: 18 (19 Jan 2018 05:40) (18 - 18)  SpO2: 99% (19 Jan 2018 05:40) (99% - 99%)  I&O's Summary      PHYSICAL EXAM:  GENERAL: NAD, Comfortable  HEAD:  Atraumatic, Normocephalic  EYES: EOMI, PERRLA, conjunctiva and sclera clear  NECK: Supple, No JVD  CHEST/LUNG: Clear to auscultation bilaterally; No wheeze  HEART: Regular rate and rhythm; No murmurs, rubs, or gallops  ABDOMEN: Soft, Nontender, Nondistended; Bowel sounds present  Neuro: AAO x 3, no focal deficit, 5/5 b/l extremities  EXTREMITIES:  2+ Peripheral Pulses, No clubbing, cyanosis, or edema  SKIN: No rashes or lesions    LABS:            CAPILLARY BLOOD GLUCOSE      POCT Blood Glucose.: 132 mg/dL (18 Jan 2018 23:21)  POCT Blood Glucose.: 116 mg/dL (18 Jan 2018 22:41)  POCT Blood Glucose.: 93 mg/dL (18 Jan 2018 17:26)  POCT Blood Glucose.: 195 mg/dL (18 Jan 2018 12:37)  POCT Blood Glucose.: 88 mg/dL (18 Jan 2018 08:30)            RADIOLOGY & ADDITIONAL TESTS:    Imaging Personally Reviewed:  [x] YES  [ ] NO    Consultant(s) Notes Reviewed:  [x] YES  [ ] NO      MEDICATIONS  (STANDING):  ascorbic acid 500 milliGRAM(s) Oral daily  aspirin enteric coated 81 milliGRAM(s) Oral daily  clopidogrel Tablet 75 milliGRAM(s) Oral daily  ferrous    sulfate 325 milliGRAM(s) Oral daily  gabapentin 300 milliGRAM(s) Oral every 8 hours  heparin  Injectable 5000 Unit(s) SubCutaneous every 8 hours  hydrochlorothiazide 25 milliGRAM(s) Oral daily  levothyroxine 112 MICROGram(s) Oral daily  losartan 50 milliGRAM(s) Oral daily  metoprolol succinate ER 50 milliGRAM(s) Oral daily  senna 2 Tablet(s) Oral at bedtime  simvastatin 40 milliGRAM(s) Oral at bedtime  tamsulosin 0.4 milliGRAM(s) Oral at bedtime  traMADol 50 milliGRAM(s) Oral two times a day    MEDICATIONS  (PRN):  acetaminophen   Tablet. 650 milliGRAM(s) Oral every 6 hours PRN Mild Pain -and moderate pain  bisacodyl 5 milliGRAM(s) Oral every 12 hours PRN Constipation  polyethylene glycol 3350 17 Gram(s) Oral daily PRN Constipation  sodium chloride 0.65% Nasal 1 Spray(s) Both Nostrils two times a day PRN Nasal Congestion      Care Discussed with Consultants/Other Providers [x] YES  [ ] NO    HEALTH ISSUES - PROBLEM Dx:  Prophylactic measure: Prophylactic measure  BPH (benign prostatic hyperplasia): BPH (benign prostatic hyperplasia)  Acquired hypothyroidism: Acquired hypothyroidism  Type 2 diabetes mellitus without complication, without long-term current use of insulin: Type 2 diabetes mellitus without complication, without long-term current use of insulin  HTN (hypertension): HTN (hypertension)  Coronary artery disease involving native coronary artery of native heart without angina pectoris: Coronary artery disease involving native coronary artery of native heart without angina pectoris  Spinal stenosis of lumbar region: Spinal stenosis of lumbar region

## 2018-01-19 NOTE — PROGRESS NOTE ADULT - PROBLEM SELECTOR PLAN 7
HSQ 5000U q8h for DVT ppx

## 2018-01-19 NOTE — PROGRESS NOTE ADULT - PROBLEM SELECTOR PROBLEM 3
HTN (hypertension)

## 2018-01-19 NOTE — PROGRESS NOTE ADULT - PROBLEM SELECTOR PROBLEM 1
Spinal stenosis of lumbar region

## 2019-01-01 NOTE — DISCHARGE NOTE ADULT - NS TRANSFER PATIENT BELONGINGS
Patients mom visited at bedside, able to complete cares independently, positive bonding noted. Patient remains in room air, VSS, no apnea or bradycardia noted. Able to complete all feeds by bottle without difficulty. Maintaining temperature in open crib. Voiding and stooling adequately. Will continue to monitor.    Cell Phone/PDA (specify)/Clothing

## 2019-01-24 NOTE — H&P ADULT - PROBLEM/PLAN-8
Addended by: ANEL PAZ on: 1/24/2019 02:07 PM     Modules accepted: Tuan Angela     DISPLAY PLAN FREE TEXT

## 2019-03-18 NOTE — DISCHARGE NOTE ADULT - NS AS DC STROKE DX YN
throat pain x2 months, no fevers/chills/cold sx, no SOB, no drooling, no dysphagia, speaking full and clear sentences in triage
no

## 2019-08-01 NOTE — ED ADULT TRIAGE NOTE - RESPIRATORY RATE (BREATHS/MIN)
[IUD Placement] : intrauterine device (IUD) placement [Prevention of Pregnancy] : prevention of pregnancy [Risks] : risks [Benefits] : benefits [Infection] : infection [Alternatives] : alternatives [Bleeding] : bleeding [Pain] : pain [Uterine Perforation] : uterine perforation [CONSENT OBTAINED] : written consent was obtained prior to the procedure. [Neg Pregnancy Test] : a pregnancy test was negative 18 [Neg GC/Chlamydia] : a a serum GC/Chlamydia was negative [No Premedication] : No premedication [None] : none [Easy Passage] : allowed easy passage of a uterine sound without dilation [ParaGard IUD] : The ParaGard IUD was inserted to the fundus of the uterus.  The IUD strings were cut to an appropriate length. [Lot Number: ___] : IUD lot number: [unfilled] [Tolerated Well] : the patient tolerated the procedure well [No Complications] : there were no complications

## 2019-08-27 PROBLEM — E03.9 HYPOTHYROIDISM, UNSPECIFIED: Chronic | Status: ACTIVE | Noted: 2017-11-02

## 2019-08-27 PROBLEM — I25.10 ATHEROSCLEROTIC HEART DISEASE OF NATIVE CORONARY ARTERY WITHOUT ANGINA PECTORIS: Chronic | Status: ACTIVE | Noted: 2017-11-02

## 2019-08-30 ENCOUNTER — APPOINTMENT (OUTPATIENT)
Dept: CT IMAGING | Facility: IMAGING CENTER | Age: 69
End: 2019-08-30
Payer: MEDICARE

## 2019-08-30 ENCOUNTER — OUTPATIENT (OUTPATIENT)
Dept: OUTPATIENT SERVICES | Facility: HOSPITAL | Age: 69
LOS: 1 days | End: 2019-08-30
Payer: COMMERCIAL

## 2019-08-30 DIAGNOSIS — Z98.1 ARTHRODESIS STATUS: Chronic | ICD-10-CM

## 2019-08-30 DIAGNOSIS — Z98.890 OTHER SPECIFIED POSTPROCEDURAL STATES: Chronic | ICD-10-CM

## 2019-08-30 DIAGNOSIS — Z00.8 ENCOUNTER FOR OTHER GENERAL EXAMINATION: ICD-10-CM

## 2019-08-30 PROCEDURE — 73700 CT LOWER EXTREMITY W/O DYE: CPT | Mod: 26,LT

## 2019-08-30 PROCEDURE — 73700 CT LOWER EXTREMITY W/O DYE: CPT

## 2020-02-03 NOTE — ED ADULT TRIAGE NOTE - CADM TRG TX PRIOR TO ARRIVAL
bleeding control [Coronary Artery Disease] : coronary artery disease [Atrial Fibrillation] : atrial fibrillation [CABG Follow-up] : bypass graft

## 2020-11-15 NOTE — ED ADULT NURSE NOTE - OBJECTIVE STATEMENT
Pt 66y male, aaox3 and at baseline walks with walker but presents to ED c/o bilateral LE weakness, as per pt he tried to get up this morning and felt weak slid off chair and could not get up, he states that he fell yesterday hitting his backside and head. Pt denies LOC during fall and denies any head trauma. PMH DM, HTN, HLD, spinal stenosis, MVR (on plavix and aspirin). Pt recently seen here at [facility] yesterday for epistaxis, left nasal packing was placed, packing still currently in place. 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 Stage 2 pressure ulcer to sacral area, dressing applied. Multiple skin tears and scabs to both legs. IV 20G placed right hand, labs drawn and sent, pt denies cp, sob, n/v/d, ha, fever, chills, blood stool, dysuria, numbness, tingling. Will continue to monitor.
Former smoker

## 2021-01-20 NOTE — PHYSICAL THERAPY INITIAL EVALUATION ADULT - ASSISTIVE DEVICE:SUPINE/SIT, REHAB EVAL
Detail Level: Simple Instructions: This plan will send the code FBSD to the PM system.  DO NOT or CHANGE the price. Price (Do Not Change): 0.00 Body Of Note (Please Add Your Own Text Here): Patient denies full skin exam bed rails

## 2022-03-21 NOTE — PROVIDER CONTACT NOTE (OTHER) - ACTION/TREATMENT ORDERED:
----- Message from Mary Oscar DO sent at 3/16/2022  2:38 PM EDT -----  Regarding: ICD  Please program at VT zone at 150 bpm monitor only.   -TB NP aware. NP will change anti-hypertensive orders. Will continue to monitor.

## 2022-04-18 ENCOUNTER — NON-APPOINTMENT (OUTPATIENT)
Age: 72
End: 2022-04-18

## 2022-04-18 ENCOUNTER — APPOINTMENT (OUTPATIENT)
Dept: OPHTHALMOLOGY | Facility: CLINIC | Age: 72
End: 2022-04-18
Payer: MEDICARE

## 2022-04-18 PROCEDURE — 99204 OFFICE O/P NEW MOD 45 MIN: CPT

## 2022-06-29 NOTE — ED ADULT NURSE NOTE - MODE OF DISCHARGE
(Observe) Observe for now without intervention. The patient was advised to contact office with any change or worsening of vision. Stretcher

## 2024-12-24 NOTE — ED PROVIDER NOTE - NSTIMEPROVIDERCAREINITIATE_GEN_ER
Routing to provider to review drug interaction      Cholesterol Medication Protocol Passed   ATORVASTATIN 20 MG Oral Tab [Pharmacy Med Name: Atorvastatin Calcium Oral Tablet 20 MG]  12/20/2024  8:07 AM    ALT < 80    ALT resulted within past year    Lipid panel within past 12 months    In person appointment or virtual visit in the past 12 mos or appointment in next 3 mos  
22-Dec-2017 13:06
